# Patient Record
Sex: FEMALE | Race: WHITE | Employment: OTHER | URBAN - METROPOLITAN AREA
[De-identification: names, ages, dates, MRNs, and addresses within clinical notes are randomized per-mention and may not be internally consistent; named-entity substitution may affect disease eponyms.]

---

## 2014-05-07 LAB — ANTIBODY: NONREACTIVE

## 2017-02-07 RX ORDER — ALENDRONATE SODIUM 70 MG/1
TABLET ORAL
Qty: 12 TABLET | Refills: 0 | Status: SHIPPED | OUTPATIENT
Start: 2017-02-07 | End: 2017-03-28 | Stop reason: SDUPTHER

## 2017-03-28 ENCOUNTER — HOSPITAL ENCOUNTER (OUTPATIENT)
Dept: GENERAL RADIOLOGY | Age: 62
Discharge: HOME | End: 2017-03-28
Attending: INTERNAL MEDICINE | Admitting: INTERNAL MEDICINE

## 2017-03-28 ENCOUNTER — OFFICE VISIT (OUTPATIENT)
Age: 62
End: 2017-03-28

## 2017-03-28 VITALS
HEIGHT: 68 IN | BODY MASS INDEX: 27.55 KG/M2 | SYSTOLIC BLOOD PRESSURE: 118 MMHG | DIASTOLIC BLOOD PRESSURE: 74 MMHG | WEIGHT: 181.8 LBS

## 2017-03-28 DIAGNOSIS — Z51.81 MEDICATION MONITORING ENCOUNTER: ICD-10-CM

## 2017-03-28 DIAGNOSIS — M81.0 OSTEOPOROSIS, POSTMENOPAUSAL: Primary | ICD-10-CM

## 2017-03-28 DIAGNOSIS — M81.0 OSTEOPOROSIS, POSTMENOPAUSAL: ICD-10-CM

## 2017-03-28 PROCEDURE — 99214 OFFICE O/P EST MOD 30 MIN: CPT | Performed by: INTERNAL MEDICINE

## 2017-03-28 PROCEDURE — 77080 DXA BONE DENSITY AXIAL: CPT | Performed by: INTERNAL MEDICINE

## 2017-03-28 RX ORDER — ALENDRONATE SODIUM 70 MG/1
TABLET ORAL
Qty: 12 TABLET | Refills: 0 | Status: SHIPPED | OUTPATIENT
Start: 2017-03-28 | End: 2018-01-24 | Stop reason: SDUPTHER

## 2017-04-03 ENCOUNTER — PROCEDURE VISIT (OUTPATIENT)
Age: 62
End: 2017-04-03

## 2017-04-03 DIAGNOSIS — M81.0 OSTEOPOROSIS, POSTMENOPAUSAL: Primary | ICD-10-CM

## 2018-04-09 ENCOUNTER — TELEPHONE (OUTPATIENT)
Age: 63
End: 2018-04-09

## 2018-06-18 ENCOUNTER — OFFICE VISIT (OUTPATIENT)
Age: 63
End: 2018-06-18

## 2018-06-18 ENCOUNTER — HOSPITAL ENCOUNTER (OUTPATIENT)
Dept: GENERAL RADIOLOGY | Age: 63
Discharge: HOME | End: 2018-06-18
Admitting: INTERNAL MEDICINE

## 2018-06-18 ENCOUNTER — PROCEDURE VISIT (OUTPATIENT)
Age: 63
End: 2018-06-18

## 2018-06-18 VITALS
BODY MASS INDEX: 27.92 KG/M2 | DIASTOLIC BLOOD PRESSURE: 78 MMHG | WEIGHT: 184.2 LBS | HEIGHT: 68 IN | SYSTOLIC BLOOD PRESSURE: 122 MMHG

## 2018-06-18 DIAGNOSIS — M81.0 OSTEOPOROSIS, POSTMENOPAUSAL: Primary | ICD-10-CM

## 2018-06-18 DIAGNOSIS — Z51.81 MEDICATION MONITORING ENCOUNTER: ICD-10-CM

## 2018-06-18 DIAGNOSIS — M81.0 OSTEOPOROSIS, POSTMENOPAUSAL: ICD-10-CM

## 2018-06-18 PROCEDURE — 3017F COLORECTAL CA SCREEN DOC REV: CPT | Performed by: INTERNAL MEDICINE

## 2018-06-18 PROCEDURE — 1123F ACP DISCUSS/DSCN MKR DOCD: CPT | Performed by: INTERNAL MEDICINE

## 2018-06-18 PROCEDURE — 1036F TOBACCO NON-USER: CPT | Performed by: INTERNAL MEDICINE

## 2018-06-18 PROCEDURE — G8399 PT W/DXA RESULTS DOCUMENT: HCPCS | Performed by: INTERNAL MEDICINE

## 2018-06-18 PROCEDURE — 77080 DXA BONE DENSITY AXIAL: CPT | Performed by: INTERNAL MEDICINE

## 2018-06-18 PROCEDURE — G8427 DOCREV CUR MEDS BY ELIG CLIN: HCPCS | Performed by: INTERNAL MEDICINE

## 2018-06-18 PROCEDURE — 1090F PRES/ABSN URINE INCON ASSESS: CPT | Performed by: INTERNAL MEDICINE

## 2018-06-18 PROCEDURE — 4040F PNEUMOC VAC/ADMIN/RCVD: CPT | Performed by: INTERNAL MEDICINE

## 2018-06-18 PROCEDURE — 99215 OFFICE O/P EST HI 40 MIN: CPT | Performed by: INTERNAL MEDICINE

## 2018-06-18 PROCEDURE — G8419 CALC BMI OUT NRM PARAM NOF/U: HCPCS | Performed by: INTERNAL MEDICINE

## 2018-06-18 RX ORDER — ALENDRONATE SODIUM 70 MG/1
TABLET ORAL
Qty: 12 TABLET | Refills: 4 | Status: SHIPPED | OUTPATIENT
Start: 2018-06-18 | End: 2019-08-20 | Stop reason: ALTCHOICE

## 2018-06-26 DIAGNOSIS — M81.0 OSTEOPOROSIS, POSTMENOPAUSAL: Primary | ICD-10-CM

## 2018-06-27 ENCOUNTER — NURSE ONLY (OUTPATIENT)
Age: 63
End: 2018-06-27

## 2018-06-27 DIAGNOSIS — M81.0 OSTEOPOROSIS, POSTMENOPAUSAL: Primary | ICD-10-CM

## 2018-06-27 PROCEDURE — 99999 PR OFFICE/OUTPT VISIT,PROCEDURE ONLY: CPT | Performed by: INTERNAL MEDICINE

## 2018-06-27 PROCEDURE — 96372 THER/PROPH/DIAG INJ SC/IM: CPT | Performed by: INTERNAL MEDICINE

## 2018-12-31 DIAGNOSIS — M81.0 OSTEOPOROSIS, POSTMENOPAUSAL: Primary | ICD-10-CM

## 2019-01-03 ENCOUNTER — NURSE ONLY (OUTPATIENT)
Dept: ENDOCRINOLOGY | Age: 64
End: 2019-01-03
Payer: MEDICARE

## 2019-01-03 DIAGNOSIS — M81.0 OSTEOPOROSIS, POSTMENOPAUSAL: Primary | ICD-10-CM

## 2019-01-03 PROCEDURE — 96372 THER/PROPH/DIAG INJ SC/IM: CPT | Performed by: INTERNAL MEDICINE

## 2019-08-20 ENCOUNTER — OFFICE VISIT (OUTPATIENT)
Dept: ENDOCRINOLOGY | Age: 64
End: 2019-08-20
Payer: MEDICARE

## 2019-08-20 ENCOUNTER — PROCEDURE VISIT (OUTPATIENT)
Dept: ENDOCRINOLOGY | Age: 64
End: 2019-08-20
Payer: MEDICARE

## 2019-08-20 ENCOUNTER — HOSPITAL ENCOUNTER (OUTPATIENT)
Dept: GENERAL RADIOLOGY | Age: 64
Discharge: HOME | End: 2019-08-20
Payer: MEDICARE

## 2019-08-20 VITALS
WEIGHT: 181.6 LBS | DIASTOLIC BLOOD PRESSURE: 78 MMHG | BODY MASS INDEX: 27.52 KG/M2 | HEIGHT: 68 IN | SYSTOLIC BLOOD PRESSURE: 115 MMHG

## 2019-08-20 DIAGNOSIS — Z51.81 MEDICATION MONITORING ENCOUNTER: ICD-10-CM

## 2019-08-20 DIAGNOSIS — M81.0 OSTEOPOROSIS, POSTMENOPAUSAL: Primary | ICD-10-CM

## 2019-08-20 DIAGNOSIS — M81.0 OSTEOPOROSIS, POSTMENOPAUSAL: ICD-10-CM

## 2019-08-20 PROCEDURE — 77080 DXA BONE DENSITY AXIAL: CPT

## 2019-08-20 PROCEDURE — G8427 DOCREV CUR MEDS BY ELIG CLIN: HCPCS | Performed by: INTERNAL MEDICINE

## 2019-08-20 PROCEDURE — 99214 OFFICE O/P EST MOD 30 MIN: CPT | Performed by: INTERNAL MEDICINE

## 2019-08-20 PROCEDURE — 1090F PRES/ABSN URINE INCON ASSESS: CPT | Performed by: INTERNAL MEDICINE

## 2019-08-20 PROCEDURE — 1123F ACP DISCUSS/DSCN MKR DOCD: CPT | Performed by: INTERNAL MEDICINE

## 2019-08-20 PROCEDURE — G8419 CALC BMI OUT NRM PARAM NOF/U: HCPCS | Performed by: INTERNAL MEDICINE

## 2019-08-20 PROCEDURE — 96372 THER/PROPH/DIAG INJ SC/IM: CPT | Performed by: INTERNAL MEDICINE

## 2019-08-20 PROCEDURE — 4040F PNEUMOC VAC/ADMIN/RCVD: CPT | Performed by: INTERNAL MEDICINE

## 2019-08-20 PROCEDURE — G8399 PT W/DXA RESULTS DOCUMENT: HCPCS | Performed by: INTERNAL MEDICINE

## 2019-08-20 PROCEDURE — 1036F TOBACCO NON-USER: CPT | Performed by: INTERNAL MEDICINE

## 2019-08-20 PROCEDURE — 77080 DXA BONE DENSITY AXIAL: CPT | Performed by: INTERNAL MEDICINE

## 2019-08-20 PROCEDURE — 3017F COLORECTAL CA SCREEN DOC REV: CPT | Performed by: INTERNAL MEDICINE

## 2019-08-20 RX ORDER — PHENOL 1.4 %
AEROSOL, SPRAY (ML) MUCOUS MEMBRANE
COMMUNITY

## 2019-08-20 RX ORDER — ASPIRIN 81 MG/1
TABLET, CHEWABLE ORAL
COMMUNITY

## 2019-08-20 RX ORDER — VALACYCLOVIR HYDROCHLORIDE 500 MG/1
500 TABLET, FILM COATED ORAL
COMMUNITY
Start: 2018-07-03

## 2019-08-20 RX ORDER — PREDNISONE 20 MG/1
TABLET ORAL
COMMUNITY
Start: 2019-08-16 | End: 2020-09-15 | Stop reason: ALTCHOICE

## 2019-08-20 RX ORDER — LEVOFLOXACIN 750 MG/1
TABLET ORAL
COMMUNITY
Start: 2019-08-16 | End: 2020-09-15 | Stop reason: ALTCHOICE

## 2019-08-20 RX ORDER — CETIRIZINE HYDROCHLORIDE 10 MG/1
10 TABLET ORAL
COMMUNITY

## 2019-08-20 NOTE — PROGRESS NOTES
[facility]) Osteoporosis and [address] [facility] [address] [address] [place] [address]  Phone [phone]  Fax [phone]    PATIENT NAME: Andrey Pineda OF BIRTH: 1955  INITIAL CONSULTATION: 06/18/2008  LAST OFFICE VISIT: 06/18/2018  TODAY'S VISIT: 08/20/2019    Labs @ [facility] 05/2019    PROBLEMS:   Osteoporosis by DXA 11/2007, lowest T-score -3.0, Z-score -2.0 in the spine (L2-L4)    Family history of osteoporosis, mother with vertebral compression fractures    Low bone density by DXA 05/2002, lowest Z-score -1.8 in the spine (L2-L4)    BMD decreased 8313-6070 and further 0264-4649 while on Fosamax    BMD decreased 2865-0307, lowest T-score -2.3 in the spine    Stress fracture of foot 2008, 60-mile breast cancer walk    Coccyx fracture 11/2009 (fell)  Natural menopause age 46 (2004)  Disc disease, thoracic spine surgery 1997 and 2006  NEW 07/2019: sinus infection => bronchitis => pneumonia (prednisone since ~08/07/201    CURRENT MANAGEMENT FOR BONE HEALTH:   Calcium: 300 mg/d diet + 1330 mg/d supplements = 1630 mg/d  Vitamin D: 800 IU/d multivitamin + 800 IU/d with calcium = 1600 IU/d    25-OH D 37 ng/mL 05/2019  Exercise:  3 x weekly, walks 2 x weekly  Pharmacologic therapy: Prolia 60 mg SQ twice yearly started 01/2019    PREVIOUS MEDICATIONS FOR OSTEOPOROSIS:   Fosamax 7402-4631, stopped due to duration of treatment  alendronate 70 mg weekly started back 2014 by Dr. Moo Mario, stopped 01/2018 by Dr. Moo Mario due to GERD symptoms that were not limited to the day of the week she took alendronate    OTHER CURRENT MEDICATIONS (SELECTED): Fluticasone nasal spray  OTC MEDICATIONS: Fish oil, glucosamine    CHIEF COMPLAINT: Here for f/u of low bone density and fractures, monitoring treatment. No new related signs or symptoms.     PAST MEDICAL HISTORY, FAMILY HISTORY, SOCIAL HISTORY AND REVIEW OF SYSTEMS:  Relevant changes since last visit (see patient questionnaire of todays

## 2020-02-26 ENCOUNTER — NURSE ONLY (OUTPATIENT)
Dept: ENDOCRINOLOGY | Age: 65
End: 2020-02-26
Payer: MEDICARE

## 2020-02-26 PROCEDURE — 96372 THER/PROPH/DIAG INJ SC/IM: CPT | Performed by: INTERNAL MEDICINE

## 2020-09-15 ENCOUNTER — HOSPITAL ENCOUNTER (OUTPATIENT)
Dept: GENERAL RADIOLOGY | Age: 65
Discharge: HOME | End: 2020-09-15
Payer: MEDICARE

## 2020-09-15 ENCOUNTER — OFFICE VISIT (OUTPATIENT)
Dept: ENDOCRINOLOGY | Age: 65
End: 2020-09-15
Payer: MEDICARE

## 2020-09-15 VITALS
WEIGHT: 149.4 LBS | DIASTOLIC BLOOD PRESSURE: 79 MMHG | HEIGHT: 68 IN | BODY MASS INDEX: 22.64 KG/M2 | SYSTOLIC BLOOD PRESSURE: 122 MMHG

## 2020-09-15 PROCEDURE — 77080 DXA BONE DENSITY AXIAL: CPT | Performed by: INTERNAL MEDICINE

## 2020-09-15 PROCEDURE — 77080 DXA BONE DENSITY AXIAL: CPT

## 2020-09-15 PROCEDURE — 1123F ACP DISCUSS/DSCN MKR DOCD: CPT | Performed by: INTERNAL MEDICINE

## 2020-09-15 PROCEDURE — 96372 THER/PROPH/DIAG INJ SC/IM: CPT | Performed by: INTERNAL MEDICINE

## 2020-09-15 PROCEDURE — 3017F COLORECTAL CA SCREEN DOC REV: CPT | Performed by: INTERNAL MEDICINE

## 2020-09-15 PROCEDURE — 1090F PRES/ABSN URINE INCON ASSESS: CPT | Performed by: INTERNAL MEDICINE

## 2020-09-15 PROCEDURE — 1036F TOBACCO NON-USER: CPT | Performed by: INTERNAL MEDICINE

## 2020-09-15 PROCEDURE — G8399 PT W/DXA RESULTS DOCUMENT: HCPCS | Performed by: INTERNAL MEDICINE

## 2020-09-15 PROCEDURE — G8420 CALC BMI NORM PARAMETERS: HCPCS | Performed by: INTERNAL MEDICINE

## 2020-09-15 PROCEDURE — 4040F PNEUMOC VAC/ADMIN/RCVD: CPT | Performed by: INTERNAL MEDICINE

## 2020-09-15 PROCEDURE — 99214 OFFICE O/P EST MOD 30 MIN: CPT | Performed by: INTERNAL MEDICINE

## 2020-09-15 PROCEDURE — G8427 DOCREV CUR MEDS BY ELIG CLIN: HCPCS | Performed by: INTERNAL MEDICINE

## 2020-09-15 RX ORDER — BUTALBITAL, ACETAMINOPHEN AND CAFFEINE 50; 325; 40 MG/1; MG/1; MG/1
1 TABLET ORAL EVERY 6 HOURS PRN
COMMUNITY
Start: 2019-12-06

## 2020-09-16 ENCOUNTER — PROCEDURE VISIT (OUTPATIENT)
Dept: ENDOCRINOLOGY | Age: 65
End: 2020-09-16

## 2020-09-16 NOTE — RESULT ENCOUNTER NOTE
[facility]) Osteoporosis and [address] [facility] [address] [place] [address]   Phone [phone]  Fax [phone]    NAME: Yuan Schwarz   : 1955   STUDY DATE: 2020     REFERRING PROVIDER: Isidro Mejia MD    INDICATION(S) FOR PERFORMING THE STUDY:  osteoporosis, age related (M81.0)    CLINICAL INFORMATION PROVIDED BY THE PATIENT: 69-year-old woman. Natural menopause was age 46. No history of fragility fractures. No long-term corticosteroid use. She took Fosamax 2714-6935 and again -2018. Current treatment is Prolia started 2019. Family history of osteoporosis (mother). EQUIPMENT: [employer]. POSITIONING: Good. REGIONS OF INTEREST: Correct. ARTIFACTS: None. STUDY VALID? Yes. Spine BMD is spuriously high because of generalized degenerative change. I deleted L2 starting in  due to T-score discrepancy. L1 had been deleted on previous studies. In  the bottom of the total hip box was too low. T-scores  Initial study: 2007 L3-L4 -3.3 left fem. neck -1.5   Current study: 2020 L3-L4 -2.0 left fem. neck -1.1     The table below shows bone mineral density (grams/cm2), the appropriate measure for comparing serial scans. An increase or decrease is significant based on precision studies done at our center according to the ISCD protocol. PA spine Proximal Femur (left)   Date L3-L4 Fem. neck Trochanter Total hip   2007 0.736 0.696 0.624 0.829   2008 0.850 0.707 0.654 0.869   2009 0.853 0.668 0.637 0.847   2011 0.851 0.729 0.648 0.859   2014 0.780 0.688 0.589 Invalid   2015 0.794 0.715 0.623 0.827   2017 0.831 0.697 0.636 0.847   2018 0.842 0.693 0.637 0.860   2019 0.788 0.716 0.639 0.854   2020 0.879 0.723 0.643 0.859     IMPRESSION:  BONE DENSITY IS LOW, CONSISTENT WITH OSTEOPOROSIS.  SINCE THE LAST DXA, BMD INCREASED IN THE SPINE AND DID NOT CHANGE SIGNIFICANTLY IN THE LEFT HIP. COMPARED WITH 06/2018, BEFORE STARTING PROLIA, BMD IS HIGHER NOW IN THE SPINE AND FEMORAL NECK. Consider repeating this study in 1-2 years to assess the patient's progress. _________________________________________________   Annabella Sharp MD, Director, [facility]) Osteoporosis and [address] [place] [address]

## 2020-09-16 NOTE — PROGRESS NOTES
date).    INTERVAL HISTORY:  See problem list for chronic/inactive conditions. She received Prolia without side effects. No falls, near-falls or fractures. She has been battling upper and lower respiratory issues for about a month. NEUROLOGIC EXAM: Able to rise from chair without using arms. No apparent focal motor or sensory deficit. Reflexes brisk and symmetric. Coordination appears normal.  MUSCULOSKELETAL EXAM: Gait: Intact without difficulty. Steady without assistance. Spine: Spinal contours are normal.  No spine tenderness to palpation or percussion. Ribs and pelvis: Ribs appear normal. Two finger spaces between ribs and pelvis. BONE DENSITY: Most recent done here using [employer] ([employer]) equipment. I deleted L2 starting in 2015 due to T-score discrepancy. L1 had been deleted on previous studies. T-scores  Initial study: 11/26/2007 L3-L4 -3.3 left fem. neck -1.5   Current study: 08/31/2020 L3-L4 -2.0 left fem. neck -1.1     The table below shows bone mineral density (grams/cm2), the appropriate measure for comparing serial scans. An increase or decrease is significant based on precision studies done at our center according to the ISCD protocol. PA spine Proximal Femur (left)   Date L3-L4 Fem. neck Trochanter Total hip   11/26/2007 0.736 0.696 0.624 0.829   11/26/2008 0.850 0.707 0.654 0.869   12/07/2009 0.853 0.668 0.637 0.847   02/09/2011 0.851 0.729 0.648 0.859   05/20/2014 0.780 0.688 0.589 Invalid   12/14/2015 0.794 0.715 0.623 0.827   03/28/2017 0.831 0.697 0.636 0.847   06/18/2018 0.842 0.693 0.637 0.860   08/20/2019 0.788 0.716 0.639 0.854   08/31/2020 0.879 0.723 0.643 0.859     IMPRESSION:  BONE DENSITY IS LOW, CONSISTENT WITH OSTEOPOROSIS. SINCE THE LAST DXA, BMD INCREASED IN THE SPINE AND DID NOT CHANGE SIGNIFICANTLY IN THE LEFT HIP. COMPARED WITH 06/2018, BEFORE STARTING PROLIA, BMD IS HIGHER NOW IN THE SPINE AND FEMORAL NECK.      LABS. 07/2015, Ca 10.2, Cr 0.9. 05/2017 Ca 9.8 Cr 0. 9. 01/2018 TSH. 95.2918 Ca 8.6 Cr 0.8. IMAGING. DXA printouts reviewed. 02/2018 CXR. ASSESSMENT: Osteoporosis with bone density lower than expected and lower than expected for her age and sex due in part to family history. Spine BMD decreased from 2006 to 2007 while on bisphosphonate therapy. Spine BMD decreased from 2006 to 2007 and we recommended Forteo which she declined. Spine BMD has been stable since 2007 but femoral neck BMD decreased 2008 to 2009. She is doing find back on treatment with alendronate since 2014 (but off since 01/2018 due to GI side effects). She is doing OK with Prolia started 01/2019. PLANS:   OK to give Prolia today and continue Prolia 60 mg SQ twice yearly. Return appointment in 1 year with DXA      I spent 25 minutes face to face with this patient. Over 50% of that time was spent on counseling and care coordination. See assessment and plan for counseling and care coordination details. Tiburcio Sharp MD, Director, [facility]) Osteoporosis and Bone Health Services    CC: Alayna Weiss MD

## 2021-03-17 ENCOUNTER — NURSE ONLY (OUTPATIENT)
Dept: ENDOCRINOLOGY | Age: 66
End: 2021-03-17
Payer: MEDICARE

## 2021-03-17 DIAGNOSIS — M81.0 OSTEOPOROSIS, POSTMENOPAUSAL: ICD-10-CM

## 2021-03-17 PROCEDURE — 96372 THER/PROPH/DIAG INJ SC/IM: CPT | Performed by: INTERNAL MEDICINE

## 2021-03-18 ENCOUNTER — TELEPHONE (OUTPATIENT)
Dept: ENDOCRINOLOGY | Age: 66
End: 2021-03-18

## 2021-03-18 NOTE — TELEPHONE ENCOUNTER
Foot fractures are not part of osteoporosis and not what Prolia works on. Podiatrists and orthopedists are the specialists who deal with foot fractures. These are due to the shape of a person's foot, their footgear, how their foot lands when they walk, and how much walking they are doing.

## 2021-03-22 NOTE — TELEPHONE ENCOUNTER
Left message for the patient to call the office related to foot fractures    Foot fractures are not part of osteoporosis and not what Prolia works on.     Podiatrists and orthopedists are the specialists who deal with foot fractures. These are due to the shape of a person's foot, their footgear, how their foot lands when they walk, and how much walking they are doing.

## 2021-04-14 ENCOUNTER — OFFICE VISIT (OUTPATIENT)
Dept: INTERNAL MEDICINE CLINIC | Age: 66
End: 2021-04-14
Payer: MEDICARE

## 2021-04-14 VITALS
SYSTOLIC BLOOD PRESSURE: 118 MMHG | BODY MASS INDEX: 26.68 KG/M2 | HEIGHT: 67 IN | WEIGHT: 170 LBS | RESPIRATION RATE: 16 BRPM | HEART RATE: 64 BPM | OXYGEN SATURATION: 98 % | TEMPERATURE: 98.4 F | DIASTOLIC BLOOD PRESSURE: 80 MMHG

## 2021-04-14 DIAGNOSIS — Z76.89 ENCOUNTER TO ESTABLISH CARE: Primary | ICD-10-CM

## 2021-04-14 DIAGNOSIS — M54.50 CHRONIC MIDLINE LOW BACK PAIN WITHOUT SCIATICA: ICD-10-CM

## 2021-04-14 DIAGNOSIS — G89.29 CHRONIC MIDLINE LOW BACK PAIN WITHOUT SCIATICA: ICD-10-CM

## 2021-04-14 DIAGNOSIS — J30.9 ALLERGIC RHINITIS, UNSPECIFIED SEASONALITY, UNSPECIFIED TRIGGER: ICD-10-CM

## 2021-04-14 DIAGNOSIS — L72.3 SEBACEOUS CYST OF EAR: ICD-10-CM

## 2021-04-14 DIAGNOSIS — Z23 NEED FOR VACCINATION FOR PNEUMOCOCCUS: ICD-10-CM

## 2021-04-14 DIAGNOSIS — M81.0 OSTEOPOROSIS, UNSPECIFIED OSTEOPOROSIS TYPE, UNSPECIFIED PATHOLOGICAL FRACTURE PRESENCE: ICD-10-CM

## 2021-04-14 DIAGNOSIS — E55.9 VITAMIN D DEFICIENCY: ICD-10-CM

## 2021-04-14 DIAGNOSIS — Z12.11 SCREENING FOR COLON CANCER: ICD-10-CM

## 2021-04-14 DIAGNOSIS — R22.32 MASS OF LEFT WRIST: ICD-10-CM

## 2021-04-14 PROCEDURE — 3017F COLORECTAL CA SCREEN DOC REV: CPT | Performed by: INTERNAL MEDICINE

## 2021-04-14 PROCEDURE — 4040F PNEUMOC VAC/ADMIN/RCVD: CPT | Performed by: INTERNAL MEDICINE

## 2021-04-14 PROCEDURE — 1036F TOBACCO NON-USER: CPT | Performed by: INTERNAL MEDICINE

## 2021-04-14 PROCEDURE — G8417 CALC BMI ABV UP PARAM F/U: HCPCS | Performed by: INTERNAL MEDICINE

## 2021-04-14 PROCEDURE — G8427 DOCREV CUR MEDS BY ELIG CLIN: HCPCS | Performed by: INTERNAL MEDICINE

## 2021-04-14 PROCEDURE — 1090F PRES/ABSN URINE INCON ASSESS: CPT | Performed by: INTERNAL MEDICINE

## 2021-04-14 PROCEDURE — G0009 ADMIN PNEUMOCOCCAL VACCINE: HCPCS | Performed by: INTERNAL MEDICINE

## 2021-04-14 PROCEDURE — 1123F ACP DISCUSS/DSCN MKR DOCD: CPT | Performed by: INTERNAL MEDICINE

## 2021-04-14 PROCEDURE — G8399 PT W/DXA RESULTS DOCUMENT: HCPCS | Performed by: INTERNAL MEDICINE

## 2021-04-14 PROCEDURE — 99204 OFFICE O/P NEW MOD 45 MIN: CPT | Performed by: INTERNAL MEDICINE

## 2021-04-14 PROCEDURE — 90732 PPSV23 VACC 2 YRS+ SUBQ/IM: CPT | Performed by: INTERNAL MEDICINE

## 2021-04-14 SDOH — HEALTH STABILITY: MENTAL HEALTH: HOW OFTEN DO YOU HAVE A DRINK CONTAINING ALCOHOL?: MONTHLY OR LESS

## 2021-04-14 SDOH — ECONOMIC STABILITY: INCOME INSECURITY: HOW HARD IS IT FOR YOU TO PAY FOR THE VERY BASICS LIKE FOOD, HOUSING, MEDICAL CARE, AND HEATING?: NOT ASKED

## 2021-04-14 SDOH — ECONOMIC STABILITY: FOOD INSECURITY: WITHIN THE PAST 12 MONTHS, THE FOOD YOU BOUGHT JUST DIDN'T LAST AND YOU DIDN'T HAVE MONEY TO GET MORE.: NOT ASKED

## 2021-04-14 SDOH — ECONOMIC STABILITY: TRANSPORTATION INSECURITY
IN THE PAST 12 MONTHS, HAS THE LACK OF TRANSPORTATION KEPT YOU FROM MEDICAL APPOINTMENTS OR FROM GETTING MEDICATIONS?: NOT ASKED

## 2021-04-14 ASSESSMENT — PATIENT HEALTH QUESTIONNAIRE - PHQ9
1. LITTLE INTEREST OR PLEASURE IN DOING THINGS: 0
2. FEELING DOWN, DEPRESSED OR HOPELESS: 0
SUM OF ALL RESPONSES TO PHQ9 QUESTIONS 1 & 2: 0
SUM OF ALL RESPONSES TO PHQ QUESTIONS 1-9: 0
SUM OF ALL RESPONSES TO PHQ QUESTIONS 1-9: 0

## 2021-04-14 ASSESSMENT — ENCOUNTER SYMPTOMS
DIARRHEA: 0
BACK PAIN: 1
RHINORRHEA: 0
COUGH: 0
SORE THROAT: 0
NAUSEA: 0
ABDOMINAL PAIN: 0
TROUBLE SWALLOWING: 0
SHORTNESS OF BREATH: 0

## 2021-04-14 NOTE — PATIENT INSTRUCTIONS
shingrix -pharmacy-2 weeks after pneumovax    Jalen Rosen, [address]    Gi/colon  [phone]

## 2021-04-20 DIAGNOSIS — R73.9 HYPERGLYCEMIA: Primary | ICD-10-CM

## 2021-04-20 DIAGNOSIS — Z76.89 ENCOUNTER TO ESTABLISH CARE: ICD-10-CM

## 2021-04-20 DIAGNOSIS — M81.0 OSTEOPOROSIS, UNSPECIFIED OSTEOPOROSIS TYPE, UNSPECIFIED PATHOLOGICAL FRACTURE PRESENCE: ICD-10-CM

## 2021-04-20 LAB
A/G RATIO: 2 (ref 1.1–2.2)
ALBUMIN SERPL-MCNC: 4.5 G/DL (ref 3.4–5)
ALP BLD-CCNC: 51 U/L (ref 40–129)
ALT SERPL-CCNC: 12 U/L (ref 10–40)
ANION GAP SERPL CALCULATED.3IONS-SCNC: 10 MMOL/L (ref 3–16)
AST SERPL-CCNC: 14 U/L (ref 15–37)
BILIRUB SERPL-MCNC: 0.4 MG/DL (ref 0–1)
BUN BLDV-MCNC: 15 MG/DL (ref 7–20)
CALCIUM SERPL-MCNC: 9 MG/DL (ref 8.3–10.6)
CHLORIDE BLD-SCNC: 105 MMOL/L (ref 99–110)
CHOLESTEROL, TOTAL: 182 MG/DL (ref 0–199)
CO2: 28 MMOL/L (ref 21–32)
CREAT SERPL-MCNC: 0.8 MG/DL (ref 0.6–1.2)
GFR AFRICAN AMERICAN: >60
GFR NON-AFRICAN AMERICAN: >60
GLOBULIN: 2.2 G/DL
GLUCOSE FASTING: 105 MG/DL (ref 70–99)
HDLC SERPL-MCNC: 68 MG/DL (ref 40–60)
LDL CHOLESTEROL CALCULATED: 95 MG/DL
POTASSIUM SERPL-SCNC: 4.8 MMOL/L (ref 3.5–5.1)
SODIUM BLD-SCNC: 143 MMOL/L (ref 136–145)
TOTAL PROTEIN: 6.7 G/DL (ref 6.4–8.2)
TRIGL SERPL-MCNC: 96 MG/DL (ref 0–150)
VITAMIN D 25-HYDROXY: 57.9 NG/ML
VLDLC SERPL CALC-MCNC: 19 MG/DL

## 2021-06-09 ENCOUNTER — TELEPHONE (OUTPATIENT)
Dept: INTERNAL MEDICINE CLINIC | Age: 66
End: 2021-06-09

## 2021-06-09 NOTE — TELEPHONE ENCOUNTER
----- Message from [facility] sent at 6/9/2021  2:26 PM EDT -----  Subject: Appointment Request    Reason for Call: Routine Pre-Op    QUESTIONS  Type of Appointment? Established Patient  Reason for appointment request? No appointments available during search  Additional Information for Provider? Patient has surgery on June 22nd 2021, she said she needs a Pre op appt and she screened green. It will be   at [facility] for cataract surgery. Call pt back to   schedule.   ---------------------------------------------------------------------------  --------------  CALL BACK INFO  What is the best way for the office to contact you? OK to leave message on   voicemail  Preferred Call Back Phone Number? [phone]  ---------------------------------------------------------------------------  --------------  SCRIPT ANSWERS  Relationship to Patient? Self  Appointment reason? Symptomatic  Select script based on patient symptoms? Adult Pre-Op   Do you have question for your provider that need to be answered prior to   scheduling your pre-op appointment? No  Have you been diagnosed with, awaiting test results for, or told that you   are suspected of having COVID-19 (Coronavirus)? (If patient has tested   negative or was tested as a requirement for work, school, or travel and   not based on symptoms, answer no)? No  Do you currently have flu-like symptoms including fever or chills, cough,   shortness of breath, difficulty breathing, or new loss of taste or smell? No  Have you had close contact with someone with COVID-19 in the last 14 days? No  (Service Expert  click yes below to proceed with Diarize As Usual   Scheduling)?  Yes

## 2021-06-16 ENCOUNTER — OFFICE VISIT (OUTPATIENT)
Dept: INTERNAL MEDICINE CLINIC | Age: 66
End: 2021-06-16
Payer: MEDICARE

## 2021-06-16 VITALS
BODY MASS INDEX: 24.86 KG/M2 | TEMPERATURE: 98.5 F | SYSTOLIC BLOOD PRESSURE: 100 MMHG | HEIGHT: 68 IN | WEIGHT: 164 LBS | OXYGEN SATURATION: 98 % | RESPIRATION RATE: 16 BRPM | DIASTOLIC BLOOD PRESSURE: 64 MMHG | HEART RATE: 68 BPM

## 2021-06-16 DIAGNOSIS — Z01.818 PREOP EXAMINATION: Primary | ICD-10-CM

## 2021-06-16 PROCEDURE — 99212 OFFICE O/P EST SF 10 MIN: CPT | Performed by: INTERNAL MEDICINE

## 2021-06-16 PROCEDURE — G8428 CUR MEDS NOT DOCUMENT: HCPCS | Performed by: INTERNAL MEDICINE

## 2021-06-16 PROCEDURE — 1090F PRES/ABSN URINE INCON ASSESS: CPT | Performed by: INTERNAL MEDICINE

## 2021-06-16 PROCEDURE — G8417 CALC BMI ABV UP PARAM F/U: HCPCS | Performed by: INTERNAL MEDICINE

## 2021-06-16 RX ORDER — FLUTICASONE PROPIONATE 50 MCG
2 SPRAY, SUSPENSION (ML) NASAL DAILY
Qty: 1 BOTTLE | Refills: 11
Start: 2021-06-16

## 2021-06-16 ASSESSMENT — ENCOUNTER SYMPTOMS
SORE THROAT: 1
ABDOMINAL PAIN: 0
RHINORRHEA: 0
DIARRHEA: 0
SHORTNESS OF BREATH: 0
TROUBLE SWALLOWING: 0
NAUSEA: 0
COUGH: 0

## 2021-06-16 NOTE — PROGRESS NOTES
normal.      Nose: Nose normal.   Eyes:      General: No scleral icterus. Extraocular Movements: Extraocular movements intact. Neck:      Thyroid: No thyromegaly. Cardiovascular:      Rate and Rhythm: Normal rate and regular rhythm. Heart sounds: No murmur heard. Pulmonary:      Effort: No respiratory distress. Breath sounds: Normal breath sounds. No wheezing or rales. Abdominal:      General: Bowel sounds are normal. There is no distension. Palpations: Abdomen is soft. Tenderness: There is no abdominal tenderness. Musculoskeletal:         General: Normal range of motion. Lymphadenopathy:      Cervical: No cervical adenopathy. Skin:     General: Skin is warm and dry. Neurological:      Mental Status: She is alert and oriented to person, place, and time. Cranial Nerves: No cranial nerve deficit. Sensory: No sensory deficit. Coordination: Coordination normal.   Psychiatric:         Behavior: Behavior normal.                Assessment/Plan:     1. Preop examination    Pre-Operative Risk assessment using 2014 ACC/AHA guidelines     Emergent procedure No  Active Cardiac Condition No (decompensated HF, Arrhythmia, MI <3 weeks, severe valve disease)  Risk Level of Procedure Low Risk (endoscopy, superficial skin, breast, ambulatory, or cataract, etc.)  Revised Cardiac Risk Index Risk factors: None  Measurement of Exercise Tolerance before Surgery >4 Yes    According to the 2014 ACC/AHA pre-operative risk assessment guidelines Shailesh Toure is a low risk for major cardiac complications during a low risk procedure and may continue as planned. Specific medication recommendations: May skip her routine medications morning of surgery.

## 2021-07-21 ENCOUNTER — OFFICE VISIT (OUTPATIENT)
Dept: INTERNAL MEDICINE CLINIC | Age: 66
End: 2021-07-21
Payer: MEDICARE

## 2021-07-21 ENCOUNTER — TELEPHONE (OUTPATIENT)
Dept: INTERNAL MEDICINE CLINIC | Age: 66
End: 2021-07-21

## 2021-07-21 VITALS
SYSTOLIC BLOOD PRESSURE: 112 MMHG | HEART RATE: 68 BPM | DIASTOLIC BLOOD PRESSURE: 68 MMHG | OXYGEN SATURATION: 98 % | HEIGHT: 68 IN | WEIGHT: 164 LBS | BODY MASS INDEX: 24.86 KG/M2 | TEMPERATURE: 98.1 F | RESPIRATION RATE: 16 BRPM

## 2021-07-21 DIAGNOSIS — F33.0 MAJOR DEPRESSIVE DISORDER, RECURRENT, MILD (HCC): ICD-10-CM

## 2021-07-21 DIAGNOSIS — Z01.818 PREOP EXAMINATION: ICD-10-CM

## 2021-07-21 DIAGNOSIS — Z00.00 ROUTINE GENERAL MEDICAL EXAMINATION AT A HEALTH CARE FACILITY: Primary | ICD-10-CM

## 2021-07-21 PROBLEM — F33.1 MAJOR DEPRESSIVE DISORDER, RECURRENT, MODERATE (HCC): Status: ACTIVE | Noted: 2021-07-21

## 2021-07-21 PROBLEM — F33.9 MAJOR DEPRESSIVE DISORDER, RECURRENT, UNSPECIFIED (HCC): Status: ACTIVE | Noted: 2021-07-21

## 2021-07-21 PROCEDURE — 4040F PNEUMOC VAC/ADMIN/RCVD: CPT | Performed by: INTERNAL MEDICINE

## 2021-07-21 PROCEDURE — G0439 PPPS, SUBSEQ VISIT: HCPCS | Performed by: INTERNAL MEDICINE

## 2021-07-21 PROCEDURE — 1123F ACP DISCUSS/DSCN MKR DOCD: CPT | Performed by: INTERNAL MEDICINE

## 2021-07-21 PROCEDURE — 3017F COLORECTAL CA SCREEN DOC REV: CPT | Performed by: INTERNAL MEDICINE

## 2021-07-21 RX ORDER — DULOXETIN HYDROCHLORIDE 30 MG/1
30 CAPSULE, DELAYED RELEASE ORAL DAILY
Qty: 30 CAPSULE | Refills: 1 | Status: SHIPPED | OUTPATIENT
Start: 2021-07-21

## 2021-07-21 RX ORDER — DULOXETIN HYDROCHLORIDE 30 MG/1
30 CAPSULE, DELAYED RELEASE ORAL DAILY
Qty: 30 CAPSULE | Refills: 1 | Status: SHIPPED | OUTPATIENT
Start: 2021-07-21 | End: 2021-07-21 | Stop reason: SDUPTHER

## 2021-07-21 ASSESSMENT — LIFESTYLE VARIABLES
HOW MANY STANDARD DRINKS CONTAINING ALCOHOL DO YOU HAVE ON A TYPICAL DAY: 0
HOW OFTEN DURING THE LAST YEAR HAVE YOU FAILED TO DO WHAT WAS NORMALLY EXPECTED FROM YOU BECAUSE OF DRINKING: 0
AUDIT-C TOTAL SCORE: 2
AUDIT TOTAL SCORE: 2
HAVE YOU OR SOMEONE ELSE BEEN INJURED AS A RESULT OF YOUR DRINKING: 0
HOW OFTEN DO YOU HAVE A DRINK CONTAINING ALCOHOL: 2
HOW OFTEN DURING THE LAST YEAR HAVE YOU BEEN UNABLE TO REMEMBER WHAT HAPPENED THE NIGHT BEFORE BECAUSE YOU HAD BEEN DRINKING: 0
HOW OFTEN DO YOU HAVE SIX OR MORE DRINKS ON ONE OCCASION: 0
HAS A RELATIVE, FRIEND, DOCTOR, OR ANOTHER HEALTH PROFESSIONAL EXPRESSED CONCERN ABOUT YOUR DRINKING OR SUGGESTED YOU CUT DOWN: 0
HOW OFTEN DURING THE LAST YEAR HAVE YOU HAD A FEELING OF GUILT OR REMORSE AFTER DRINKING: 0
HOW OFTEN DURING THE LAST YEAR HAVE YOU FOUND THAT YOU WERE NOT ABLE TO STOP DRINKING ONCE YOU HAD STARTED: 0
HOW OFTEN DURING THE LAST YEAR HAVE YOU NEEDED AN ALCOHOLIC DRINK FIRST THING IN THE MORNING TO GET YOURSELF GOING AFTER A NIGHT OF HEAVY DRINKING: 0

## 2021-07-21 ASSESSMENT — PATIENT HEALTH QUESTIONNAIRE - PHQ9
SUM OF ALL RESPONSES TO PHQ QUESTIONS 1-9: 1
1. LITTLE INTEREST OR PLEASURE IN DOING THINGS: 0
2. FEELING DOWN, DEPRESSED OR HOPELESS: 1
SUM OF ALL RESPONSES TO PHQ9 QUESTIONS 1 & 2: 1

## 2021-07-21 ASSESSMENT — ENCOUNTER SYMPTOMS
TROUBLE SWALLOWING: 0
SHORTNESS OF BREATH: 0
PHOTOPHOBIA: 0
EYE REDNESS: 0
RHINORRHEA: 0
EYE ITCHING: 0
SORE THROAT: 0
DIARRHEA: 0
ABDOMINAL PAIN: 0
EYE PAIN: 0
COUGH: 0
NAUSEA: 0
BACK PAIN: 1
EYE DISCHARGE: 0

## 2021-07-21 NOTE — TELEPHONE ENCOUNTER
Duloxetine was sent to [facility] in error. Please send to [facility] on [address] instead.

## 2021-07-21 NOTE — PATIENT INSTRUCTIONS
Personalized Preventive Plan for Francia Shea - 7/21/2021  Medicare offers a range of preventive health benefits. Some of the tests and screenings are paid in full while other may be subject to a deductible, co-insurance, and/or copay. Some of these benefits include a comprehensive review of your medical history including lifestyle, illnesses that may run in your family, and various assessments and screenings as appropriate. After reviewing your medical record and screening and assessments performed today your provider may have ordered immunizations, labs, imaging, and/or referrals for you. A list of these orders (if applicable) as well as your Preventive Care list are included within your After Visit Summary for your review. Other Preventive Recommendations:    · A preventive eye exam performed by an eye specialist is recommended every 1-2 years to screen for glaucoma; cataracts, macular degeneration, and other eye disorders. · A preventive dental visit is recommended every 6 months. · Try to get at least 150 minutes of exercise per week or 10,000 steps per day on a pedometer . · Order or download the FREE \"Exercise & Physical Activity: Your Everyday Guide\" from [employer]. Call 7-763.696.1602 or search [employer] online. · You need 9884-0553 mg of calcium and 1295-4115 IU of vitamin D per day. It is possible to meet your calcium requirement with diet alone, but a vitamin D supplement is usually necessary to meet this goal.  · When exposed to the sun, use a sunscreen that protects against both UVA and UVB radiation with an SPF of 30 or greater. Reapply every 2 to 3 hours or after sweating, drying off with a towel, or swimming. · Always wear a seat belt when traveling in a car. Always wear a helmet when riding a bicycle or motorcycle.

## 2021-07-21 NOTE — PROGRESS NOTES
Medicare Annual Wellness Visit  Name: Ysabel Orr Date: 2021   MRN: [id number] Sex: Female   Age: 74 y.o. Ethnicity: Non- / Non    : 1955 Race: White (non-)      Usman Castaneda is here for Medicare AWV    Screenings for behavioral, psychosocial and functional/safety risks, and cognitive dysfunction are all negative except as indicated below. These results, as well as other patient data from the [address] form, are documented in Flowsheets linked to this Encounter. No Known Allergies      Prior to Visit Medications    Medication Sig Taking? Authorizing Provider   DULoxetine (CYMBALTA) 30 MG extended release capsule Take 1 capsule by mouth daily Yes Elyssa Warren MD   butalbital-acetaminophen-caffeine (FIORICET, ESGIC) -40 MG per tablet Take 1 tablet by mouth every 6 hours as needed Yes Historical Provider, MD   TURMERIC PO Take by mouth Yes Historical Provider, MD   CRANBERRY PO Take by mouth Yes Historical Provider, MD   valACYclovir (VALTREX) 500 MG tablet Take 500 mg by mouth Yes Historical Provider, MD   Multiple Vitamins-Minerals (MULTIVITAMIN WOMEN) TABS Take by mouth Yes Historical Provider, MD   cetirizine (ZYRTEC) 10 MG tablet Take 10 mg by mouth Yes Historical Provider, MD   aspirin 81 MG chewable tablet  Yes Historical Provider, MD   PROLIA 60 MG/ML SOLN SC injection Inject 1 mL into the skin See Admin Instructions One dose every 6 months Yes Tono Medley MD   calcium carbonate (OSCAL) 500 MG TABS tablet Take 500 mg by mouth daily.  Yes Historical Provider, MD   vitamin D (CHOLECALCIFEROL) 1000 UNIT TABS tablet Take 2,000 Units by mouth daily  Yes Historical Provider, MD   fluticasone (FLONASE) 50 MCG/ACT nasal spray 2 sprays by Nasal route daily  Elyssa Warren MD         Past Medical History:   Diagnosis Date    Allergic rhinitis     gets shots-dr jesus lagos    Foot fracture     several    Headache allergy related    Low back pain     cyst, ashwini baum, gary navas    Osteoporosis     on prolia    Pneumonia 2019    Pre-diabetes        Past Surgical History:   Procedure Laterality Date    CATARACT REMOVAL  06/2021    COLONOSCOPY  04/2021    fu 3 yrs    CYSTOSCOPY      LUMBAR DISCECTOMY      times 2    SEPTOPLASTY      WISDOM TOOTH EXTRACTION           Family History   Problem Relation Age of Onset    Dementia Mother     Heart Attack Father        CareTeam (Including outside providers/suppliers regularly involved in providing care):   Patient Care Team:  Denia Toro MD as PCP - General (Internal Medicine)  Denia Toro MD as PCP - [facility] Empaneled Provider  Marilee Kitchen (Ophthalmology)    Wt Readings from Last 3 Encounters:   07/21/21 164 lb (74.4 kg)   06/16/21 164 lb (74.4 kg)   04/14/21 170 lb (77.1 kg)     Vitals:    07/21/21 1444   BP: 112/68   Site: Right Upper Arm   Position: Sitting   Cuff Size: Medium Adult   Pulse: 68   Resp: 16   Temp: 98.1 °F (36.7 °C)   TempSrc: Oral   SpO2: 98%   Weight: 164 lb (74.4 kg)   Height: 5' 7.5\" (1.715 m)     Body mass index is 25.31 kg/m². Based upon direct observation of the patient, evaluation of cognition reveals recent and remote memory intact. Clock was quickly corrected      Patient's complete Health Risk Assessment and screening values have been reviewed and are found in Flowsheets. The following problems were reviewed today and where indicated follow up appointments were made and/or referrals ordered. Positive Risk Factor Screenings with Interventions:            General Health and ACP:  General  In general, how would you say your health is?: Very Good  In the past 7 days, have you experienced any of the following?  New or Increased Pain, New or Increased Fatigue, Loneliness, Social Isolation, Stress or Anger?: None of These  Do you get the social and emotional support that you need?: (!) No  Do you have a Living Will?: Yes  Advance Directives     Power of  Living Will ACP-Advance Directive ACP-Power of     Not on File Not on File Not on File Not on File      General Health Risk Interventions:  · Inadequate social/emotional support: has therapist she sees monthly, starting duloxetine for feelings of sadness and low back pain     Hearing/Vision:  No exam data present  Hearing/Vision  Do you or your family notice any trouble with your hearing that hasn't been managed with hearing aids?: No  Do you have difficulty driving, watching TV, or doing any of your daily activities because of your eyesight?: (!) Yes  Have you had an eye exam within the past year?: Yes  Hearing/Vision Interventions:  · Vision concerns:  undergoing surgery for cataracts    Safety:  Safety  Do you have working smoke detectors?: Yes  Have all throw rugs been removed or fastened?: Yes  Do you have non-slip mats or surfaces in all bathtubs/showers?: (!) No  Do all of your stairways have a railing or banister?: Yes  Are your doorways, halls and stairs free of clutter?: Yes  Do you always fasten your seatbelt when you are in a car?: Yes  Safety Interventions:  · Home safety tips provided     Personalized Preventive Plan   Current Health Maintenance Status  Immunization History   Administered Date(s) Administered    Hepatitis A Ped/Adol (Havrix, Vaqta) 08/24/2018    Hepatitis A/Hepatitis B (Twinrix) 07/03/2018, 08/24/2018    Hepatitis B 08/24/2018    Pneumococcal Conjugate 13-valent ([id number]) 08/24/2018    Pneumococcal Polysaccharide ([id number]) 04/14/2021    Tdap (Boostrix, Adacel) 10/01/2008, 07/03/2018    Yellow Fever (YF-Vax) 06/25/2018    Zoster Live (Zostavax) 05/27/2014, 11/17/2015        Health Maintenance   Topic Date Due    Shingles Vaccine (2 of 3) 01/12/2016    Annual Wellness Visit (AWV)  Never done    Flu vaccine (1) 09/01/2021    A1C test (Diabetic or Prediabetic)  05/05/2022    Breast cancer screen 02/04/2023    Lipid screen  04/20/2026    DTaP/Tdap/Td vaccine (3 - Td or Tdap) 07/03/2028    Colon cancer screen colonoscopy  04/27/2031    DEXA (modify frequency per FRAX score)  Completed    Pneumococcal 65+ years Vaccine  Completed    COVID-19 Vaccine  Completed    Hepatitis C screen  Completed    Hepatitis A vaccine  Aged Out    Hepatitis B vaccine  Aged Out    Hib vaccine  Aged Out    Meningococcal (ACWY) vaccine  Aged Out     Recommendations for Buyou Due: see orders and patient instructions/AVS.  . Recommended screening schedule for the next 5-10 years is provided to the patient in written form: see Patient Instructions/AVS.    Tyler Jackson was seen today for medicare awv. Diagnoses and all orders for this visit:    Routine general medical examination at a health care facility    Preop examination    Major depressive disorder, recurrent, mild  -     DULoxetine (CYMBALTA) 30 MG extended release capsule; Take 1 capsule by mouth daily         ---      Preoperative Consultation      Arleen Coombs  YOB: 1955    Date of Service:  7/21/2021    Vitals:    07/21/21 1444   BP: 112/68   Site: Right Upper Arm   Position: Sitting   Cuff Size: Medium Adult   Pulse: 68  Comment: Regular   Resp: 16   Temp: 98.1 °F (36.7 °C)   TempSrc: Oral   SpO2: 98%  Comment: Room Air   Weight: 164 lb (74.4 kg)   Height: 5' 7.5\" (1.715 m)           Chief Complaint   Patient presents with    Medicare AWV       HPI:    This patient presents tot office today for a preoperative consultation at the request of surgeon, Dr. Lowell Richey, who plans on performing left eye cataract surgery on August 2 at [facility].       Planned anesthesia: Topical anesthesia /MAC  Known anesthesia problems: None   Bleeding risk: No recent or remote history of abnormal bleeding  X nosebleeds which resolved with cauterization  Personal or FH of DVT/PE: No x mom remote history of pulmonary embolism after surgery  Recent steroid use: No  Exercise tolerance: greater than 4 METs      Past Medical History:   Diagnosis Date    Allergic rhinitis     gets shots-dr jesus lagos    Foot fracture     several    Headache     allergy related    Low back pain     cyst, ashwini sarika, gary navas    Osteoporosis     on prolia    Pneumonia 2019    Pre-diabetes      Past Surgical History:   Procedure Laterality Date    CATARACT REMOVAL  06/2021    COLONOSCOPY  04/2021    fu 3 yrs    CYSTOSCOPY      LUMBAR DISCECTOMY      times 2    SEPTOPLASTY      WISDOM TOOTH EXTRACTION       Family History   Problem Relation Age of Onset    Dementia Mother     Heart Attack Father      Social History     Tobacco Use    Smoking status: Never Smoker    Smokeless tobacco: Never Used   Substance Use Topics    Alcohol use: Yes     Comment: rarely    Drug use: No       Outpatient Medications Marked as Taking for the 7/21/21 encounter (Office Visit) with Rajni Palma MD   Medication Sig Dispense Refill    DULoxetine (CYMBALTA) 30 MG extended release capsule Take 1 capsule by mouth daily 30 capsule 1    butalbital-acetaminophen-caffeine (FIORICET, ESGIC) -40 MG per tablet Take 1 tablet by mouth every 6 hours as needed      TURMERIC PO Take by mouth      CRANBERRY PO Take by mouth      valACYclovir (VALTREX) 500 MG tablet Take 500 mg by mouth      Multiple Vitamins-Minerals (MULTIVITAMIN WOMEN) TABS Take by mouth      cetirizine (ZYRTEC) 10 MG tablet Take 10 mg by mouth      aspirin 81 MG chewable tablet M-W-F      PROLIA 60 MG/ML SOLN SC injection Inject 1 mL into the skin See Admin Instructions One dose every 6 months 1 Syringe 1    calcium carbonate (OSCAL) 500 MG TABS tablet Take 500 mg by mouth daily.  vitamin D (CHOLECALCIFEROL) 1000 UNIT TABS tablet Take 2,000 Units by mouth daily        No Known Allergies    Review of Systems   Constitutional: Negative for fatigue and fever.    HENT: Negative for rhinorrhea, sore throat and trouble swallowing. Eyes: Positive for visual disturbance (cant see close up). Negative for photophobia, pain, discharge, redness and itching. Respiratory: Negative for cough and shortness of breath. Cardiovascular: Negative for chest pain and palpitations. Gastrointestinal: Negative for abdominal pain, diarrhea and nausea. Genitourinary: Negative for decreased urine volume, dysuria and frequency. Musculoskeletal: Positive for back pain. Negative for arthralgias and myalgias. Skin: Negative for rash. Allergic/Immunologic: Negative for immunocompromised state. Neurological: Negative for dizziness, numbness and headaches. Hematological: Does not bruise/bleed easily. Psychiatric/Behavioral: Positive for dysphoric mood. Negative for sleep disturbance. The patient is not nervous/anxious. Physical Exam  Vitals and nursing note reviewed. Constitutional:       General: She is not in acute distress. Appearance: She is well-developed. HENT:      Head: Normocephalic and atraumatic. Right Ear: External ear normal.      Left Ear: External ear normal.   Eyes:      General: No scleral icterus. Extraocular Movements: Extraocular movements intact. Neck:      Thyroid: No thyromegaly. Cardiovascular:      Rate and Rhythm: Normal rate and regular rhythm. Heart sounds: No murmur heard. Pulmonary:      Effort: No respiratory distress. Breath sounds: Normal breath sounds. No wheezing or rales. Abdominal:      General: Bowel sounds are normal. There is no distension. Palpations: Abdomen is soft. Tenderness: There is no abdominal tenderness. Musculoskeletal:         General: Normal range of motion. Cervical back: Normal range of motion. Right lower leg: No edema. Left lower leg: No edema. Skin:     General: Skin is warm and dry. Neurological:      Mental Status: She is alert and oriented to person, place, and time. Cranial Nerves:  No cranial nerve deficit. Sensory: No sensory deficit. Coordination: Coordination normal.   Psychiatric:         Behavior: Behavior normal.                Assessment/Plan:        1. Preop examination     Pre-Operative Risk assessment using 2014 ACC/AHA guidelines      Emergent procedure No  Active Cardiac Condition No (decompensated HF, Arrhythmia, MI <3 weeks, severe valve disease)  Risk Level of Procedure Low Risk (endoscopy, superficial skin, breast, ambulatory, or cataract, etc.)  Revised Cardiac Risk Index Risk factors: None  Measurement of Exercise Tolerance before Surgery >4 Yes     According to the 2014 ACC/AHA pre-operative risk assessment guidelines Gib Salm is a low risk for major cardiac complications during a low risk procedure and may continue as planned.  Specific medication recommendations: May skip her routine medications morning of surgery.

## 2021-10-13 ENCOUNTER — NURSE ONLY (OUTPATIENT)
Dept: ENDOCRINOLOGY | Age: 66
End: 2021-10-13
Payer: MEDICARE

## 2021-10-13 DIAGNOSIS — M81.0 OSTEOPOROSIS, POSTMENOPAUSAL: ICD-10-CM

## 2021-10-13 PROCEDURE — 96372 THER/PROPH/DIAG INJ SC/IM: CPT | Performed by: INTERNAL MEDICINE

## 2022-03-07 ENCOUNTER — OFFICE VISIT (OUTPATIENT)
Dept: ENDOCRINOLOGY | Age: 67
End: 2022-03-07
Payer: MEDICARE

## 2022-03-07 ENCOUNTER — HOSPITAL ENCOUNTER (OUTPATIENT)
Dept: GENERAL RADIOLOGY | Age: 67
Discharge: HOME | End: 2022-03-07
Payer: MEDICARE

## 2022-03-07 ENCOUNTER — PROCEDURE VISIT (OUTPATIENT)
Dept: ENDOCRINOLOGY | Age: 67
End: 2022-03-07

## 2022-03-07 VITALS
WEIGHT: 181.2 LBS | HEIGHT: 68 IN | DIASTOLIC BLOOD PRESSURE: 77 MMHG | SYSTOLIC BLOOD PRESSURE: 125 MMHG | BODY MASS INDEX: 27.46 KG/M2

## 2022-03-07 DIAGNOSIS — M81.0 OSTEOPOROSIS, POSTMENOPAUSAL: Primary | ICD-10-CM

## 2022-03-07 DIAGNOSIS — Z51.81 MEDICATION MONITORING ENCOUNTER: ICD-10-CM

## 2022-03-07 DIAGNOSIS — M81.0 OSTEOPOROSIS, POSTMENOPAUSAL: ICD-10-CM

## 2022-03-07 PROCEDURE — 1036F TOBACCO NON-USER: CPT | Performed by: INTERNAL MEDICINE

## 2022-03-07 PROCEDURE — 1090F PRES/ABSN URINE INCON ASSESS: CPT | Performed by: INTERNAL MEDICINE

## 2022-03-07 PROCEDURE — G8484 FLU IMMUNIZE NO ADMIN: HCPCS | Performed by: INTERNAL MEDICINE

## 2022-03-07 PROCEDURE — 99214 OFFICE O/P EST MOD 30 MIN: CPT | Performed by: INTERNAL MEDICINE

## 2022-03-07 PROCEDURE — 1123F ACP DISCUSS/DSCN MKR DOCD: CPT | Performed by: INTERNAL MEDICINE

## 2022-03-07 PROCEDURE — G8417 CALC BMI ABV UP PARAM F/U: HCPCS | Performed by: INTERNAL MEDICINE

## 2022-03-07 PROCEDURE — 77080 DXA BONE DENSITY AXIAL: CPT

## 2022-03-07 PROCEDURE — G8427 DOCREV CUR MEDS BY ELIG CLIN: HCPCS | Performed by: INTERNAL MEDICINE

## 2022-03-07 PROCEDURE — 3017F COLORECTAL CA SCREEN DOC REV: CPT | Performed by: INTERNAL MEDICINE

## 2022-03-07 PROCEDURE — G8399 PT W/DXA RESULTS DOCUMENT: HCPCS | Performed by: INTERNAL MEDICINE

## 2022-03-07 PROCEDURE — 4040F PNEUMOC VAC/ADMIN/RCVD: CPT | Performed by: INTERNAL MEDICINE

## 2022-03-07 PROCEDURE — 77080 DXA BONE DENSITY AXIAL: CPT | Performed by: INTERNAL MEDICINE

## 2022-03-07 NOTE — PROGRESS NOTES
[facility]) Osteoporosis and [address] [place] [address]  Phone [phone]  Fax [phone]    NAME: Marge Pettit OF BIRTH: 1955  INITIAL CONSULTATION: 06/18/2008  LAST OFFICE VISIT: 08/31/2020  TODAY'S VISIT: 03/07/2022    Labs @ [facility] 04/2021    PROBLEMS:   Osteoporosis by DXA 11/2007, lowest T-score -3.0, Z-score -2.0 in the spine (L2-L4)    Family history of osteoporosis, mother with vertebral compression fractures    Low bone density by DXA 05/2002, lowest Z-score -1.8 in the spine (L2-L4)    BMD decreased 2188-3603 and further 5982-6380 while on Fosamax    BMD decreased 9495-4249, lowest T-score -2.3 in the spine    Stress fracture of foot 2008, 60-mile breast cancer walk    Coccyx fracture 11/2009 (fell)    07/2021 LS MR old mild fx T12, L1    10/2021 ankle fracture (twisted playing pickleball); surgical intervention, slow to heal  Natural menopause age 46 (2004)  Disc disease, thoracic spine surgery 1997 and 2006  NEW 07/2019: sinus infection => bronchitis => pneumonia (prednisone since ~08/07/201    CURRENT MANAGEMENT FOR BONE HEALTH:   Calcium: 300 mg/d diet + 1330 mg/d supplements = 1630 mg/d  Vitamin D: 800 IU/d multivitamin + 800 IU/d with calcium = 1600 IU/d    25-OH D 53 ng/mL 10/2021  Exercise:  3 x weekly, walks 2 x weekly  Pharmacologic therapy: Prolia 60 mg SQ twice yearly started 01/2019    PREVIOUS MEDICATIONS FOR OSTEOPOROSIS:   Fosamax 8817-4424, stopped due to duration of treatment  alendronate 70 mg weekly started back 2014 by Dr. Keron Garcia, stopped 01/2018 by Dr. Keron Garcia due to GERD symptoms that were not limited to the day of the week she took alendronate    OTHER CURRENT MEDICATIONS (SELECTED): Fluticasone nasal spray  OTC MEDICATIONS: Fish oil, glucosamine    CHIEF COMPLAINT: Here for f/u of low bone density and fractures, monitoring treatment. No new related signs or symptoms.     PAST MEDICAL HISTORY, FAMILY HISTORY, SOCIAL HISTORY AND REVIEW OF SYSTEMS:  Relevant changes since last visit (see patient questionnaire of todays date). INTERVAL HISTORY:  See problem list for chronic/inactive conditions. She received Prolia without side effects. 10/2021 she was playing pickleball and twisted her ankle. Initial x-ray was negative but pain persisted and MRI showed a fracture that required surgical intervention and was slow to heal. Back pain radiating down leg  likely will need disc surgery soon    NEUROLOGIC EXAM: Able to rise from chair without using arms. No apparent focal motor or sensory deficit. Reflexes brisk and symmetric. Coordination appears normal.  MUSCULOSKELETAL EXAM: Gait: Intact without difficulty. Steady without assistance. Spine: Spinal contours are normal.  No spine tenderness to palpation or percussion. Ribs and pelvis: Ribs appear normal. Two finger spaces between ribs and pelvis. BONE DENSITY: Most recent done here using [employer] equipment. I deleted L2 starting in 2015 due to T-score discrepancy. L1 had been deleted on previous studies. T-scores  Initial study: 11/26/2007 L3-L4 -3.3 left fem. neck -1.5   Current study: 03/07/2022 L3-L4 -1.7 left fem. neck -0.7     The table below shows bone mineral density (grams/cm2), the appropriate measure for comparing serial scans. An increase or decrease is significant based on precision studies done at our center according to the ISCD protocol. PA spine Proximal Femur (left)   Date L3-L4 Fem. neck Trochanter Total hip   11/26/2007 0.736 0.696 0.624 0.829   11/26/2008 0.850 0.707 0.654 0.869   05/20/2014 0.780 0.688 0.589 Invalid   06/18/2018 0.842 0.693 0.637 0.860   08/20/2019 0.788 0.716 0.639 0.854   08/31/2020 0.879 0.723 0.643 0.859   03/07/2022 0.918 0.775 0.643 0.883     IMPRESSION:  BONE DENSITY IS LOW, CONSISTENT WITH OSTEOPOROSIS.  SINCE THE LAST DXA, BMD INCREASED IN THE SPINE AND FEMORAL NECK AND IS TRENDING UP IN THE TOTAL HIP. COMPARED WITH 06/2018, BEFORE STARTING PROLIA, BMD IS HIGHER NOW IN THE SPINE AND FEMORAL NECK AND TRENDING UP IN THE TOTAL HIP. LABS. 07/2015, Ca 10.2, Cr 0.9. 05/2017 Ca 9.8 Cr 0.9. 01/2018 TSH. 95.2918 Ca 8.6 Cr 0.8.  04/2021 Ca 9.0 Cr 0.8. IMAGING. DXA printouts reviewed. 02/2018 CXR. 07/2021 LS MR old mild fx T12, L1.    ASSESSMENT: Osteoporosis with bone density lower than expected and lower than expected for her age and sex due in part to family history. Spine BMD decreased from 2006 to 2007 while on bisphosphonate therapy. Spine BMD decreased from 2006 to 2007 and we recommended Forteo which she declined. Spine BMD has been stable since 2007 but femoral neck BMD decreased 2008 to 2009. She is doing find back on treatment with alendronate since 2014 (but off since 01/2018 due to GI side effects). She is doing OK with Prolia started 01/2019. PLANS:   Continue Prolia 60 mg SQ twice yearly (next dose due on/after 04/13/2022). Return appointment in 1 year with DXA. Time spent today: 30-39 minutes. Hyacinth Sharp MD, Director, [facility]) Osteoporosis and [address] [place] [address]  [email or web address]    CC: Griffin No MD

## 2022-03-07 NOTE — RESULT ENCOUNTER NOTE
[facility] ([facility]) Osteoporosis and [address] [facility] [address] [facility] [address] [facility], [address] [place] [address],[place] [address]  Phone [phone]  Fax [phone]    NAME: Bella Gamble   : 1955   STUDY DATE: 2022     REFERRING PROVIDER: Shannan Smith MD    INDICATION(S) FOR PERFORMING THE STUDY:  osteoporosis, age related (M81.0)    CLINICAL INFORMATION PROVIDED BY THE PATIENT: 58-year-old woman. Natural menopause was age 46. Ankle fracture 10/2021 (twisted playing pickleball). No long-term corticosteroid use. She took Fosamax 2362-9472 and again -2018. Current treatment is Prolia started 2019. Family history of osteoporosis (mother). EQUIPMENT: [employer]. POSITIONING: Good. REGIONS OF INTEREST: Correct. ARTIFACTS: None. STUDY VALID? Yes. Spine BMD is spuriously high because of generalized degenerative change. I deleted L2 starting in  due to T-score discrepancy. L1 had been deleted on previous studies. In  the bottom of the total hip box was too low. T-scores  Initial study: 2007 L3-L4 -3.3 left fem. neck -1.5   Current study: 2022 L3-L4 -1.7 left fem. neck -0.7     The table below shows bone mineral density (grams/cm2), the appropriate measure for comparing serial scans. An increase or decrease is significant based on precision studies done at our center according to the ISCD protocol. PA spine Proximal Femur (left)   Date L3-L4 Fem. neck Trochanter Total hip   2007 0.736 0.696 0.624 0.829   2008 0.850 0.707 0.654 0.869   2014 0.780 0.688 0.589 Invalid   2018 0.842 0.693 0.637 0.860   2019 0.788 0.716 0.639 0.854   2020 0.879 0.723 0.643 0.859   2022 0.918 0.775 0.643 0.883     IMPRESSION:  BONE DENSITY IS LOW, CONSISTENT WITH OSTEOPOROSIS.  SINCE THE LAST DXA, BMD INCREASED IN THE SPINE AND FEMORAL NECK AND IS TRENDING UP IN THE TOTAL HIP. COMPARED WITH 2018, BEFORE STARTING PROLIA, BMD IS HIGHER NOW IN THE SPINE AND FEMORAL NECK AND TRENDING UP IN THE TOTAL HIP. Consider repeating this study in 1-2 years to assess the patient's progress. _________________________________________________   Ruth Ann Sharp MD, Director, [facility]) Osteoporosis and [address] [place] [address]

## 2022-04-14 ENCOUNTER — NURSE ONLY (OUTPATIENT)
Dept: ENDOCRINOLOGY | Age: 67
End: 2022-04-14
Payer: MEDICARE

## 2022-04-14 DIAGNOSIS — M81.0 OSTEOPOROSIS, POSTMENOPAUSAL: ICD-10-CM

## 2022-04-14 PROCEDURE — 96372 THER/PROPH/DIAG INJ SC/IM: CPT | Performed by: INTERNAL MEDICINE

## 2022-04-14 NOTE — PROGRESS NOTES
Prolia supplied by the physician office. Informed patient if any signs of redness,rash,swelling or unusual symptoms occur, please contact the office. Prolia given per physician order. It is the patient's responsibility to notify the physician office of new insurance plan or new identification number prior to appointment to prevent delay in treatment. Please send a copy of the front and back of the insurance card either by fax, mail or stop by the office.

## 2022-11-02 ENCOUNTER — NURSE ONLY (OUTPATIENT)
Dept: ENDOCRINOLOGY | Age: 67
End: 2022-11-02
Payer: MEDICARE

## 2022-11-02 DIAGNOSIS — M81.0 OSTEOPOROSIS, POSTMENOPAUSAL: Primary | ICD-10-CM

## 2022-11-02 PROCEDURE — 96372 THER/PROPH/DIAG INJ SC/IM: CPT | Performed by: INTERNAL MEDICINE

## 2023-02-14 ENCOUNTER — TELEPHONE (OUTPATIENT)
Dept: INTERNAL MEDICINE CLINIC | Age: 68
End: 2023-02-14

## 2023-02-14 DIAGNOSIS — E55.9 VITAMIN D DEFICIENCY: ICD-10-CM

## 2023-02-14 DIAGNOSIS — M81.0 OSTEOPOROSIS, UNSPECIFIED OSTEOPOROSIS TYPE, UNSPECIFIED PATHOLOGICAL FRACTURE PRESENCE: ICD-10-CM

## 2023-02-14 DIAGNOSIS — Z00.00 ROUTINE GENERAL MEDICAL EXAMINATION AT A HEALTH CARE FACILITY: Primary | ICD-10-CM

## 2023-02-14 NOTE — TELEPHONE ENCOUNTER
----- Message from Dwayne No sent at 2/14/2023  4:17 PM EST -----  Subject: Referral Request    Reason for referral request? The pt called requesting orders for blood   work. She is wanting to have this done prior to her appt on 3/3/2023. She   would like to have the results when she goes to her appt on 3/3/2023. please contact the pt when the orders are ready. she is requesting these   this week. She will be out of town after 2/17/2023  Provider patient wants to be referred to(if known):     Provider Phone Number(if known):     Additional Information for Provider?   ---------------------------------------------------------------------------  --------------  [facility]    [phone]; OK to leave message on voicemail  ---------------------------------------------------------------------------  --------------

## 2023-02-15 DIAGNOSIS — Z00.00 ROUTINE GENERAL MEDICAL EXAMINATION AT A HEALTH CARE FACILITY: ICD-10-CM

## 2023-02-15 DIAGNOSIS — M81.0 OSTEOPOROSIS, UNSPECIFIED OSTEOPOROSIS TYPE, UNSPECIFIED PATHOLOGICAL FRACTURE PRESENCE: ICD-10-CM

## 2023-02-15 LAB
A/G RATIO: 1.7 (ref 1.1–2.2)
ALBUMIN SERPL-MCNC: 4.5 G/DL (ref 3.4–5)
ALP BLD-CCNC: 69 U/L (ref 40–129)
ALT SERPL-CCNC: 24 U/L (ref 10–40)
ANION GAP SERPL CALCULATED.3IONS-SCNC: 10 MMOL/L (ref 3–16)
AST SERPL-CCNC: 19 U/L (ref 15–37)
BILIRUB SERPL-MCNC: <0.2 MG/DL (ref 0–1)
BUN BLDV-MCNC: 20 MG/DL (ref 7–20)
CALCIUM SERPL-MCNC: 10.5 MG/DL (ref 8.3–10.6)
CHLORIDE BLD-SCNC: 102 MMOL/L (ref 99–110)
CHOLESTEROL, TOTAL: 229 MG/DL (ref 0–199)
CO2: 29 MMOL/L (ref 21–32)
CREAT SERPL-MCNC: 0.9 MG/DL (ref 0.6–1.2)
GFR SERPL CREATININE-BSD FRML MDRD: >60 ML/MIN/{1.73_M2}
GLUCOSE BLD-MCNC: 143 MG/DL (ref 70–99)
HDLC SERPL-MCNC: 66 MG/DL (ref 40–60)
LDL CHOLESTEROL CALCULATED: 109 MG/DL
POTASSIUM SERPL-SCNC: 4.6 MMOL/L (ref 3.5–5.1)
SODIUM BLD-SCNC: 141 MMOL/L (ref 136–145)
TOTAL PROTEIN: 7.2 G/DL (ref 6.4–8.2)
TRIGL SERPL-MCNC: 269 MG/DL (ref 0–150)
VITAMIN D 25-HYDROXY: 41.2 NG/ML
VLDLC SERPL CALC-MCNC: 54 MG/DL

## 2023-02-16 LAB
ESTIMATED AVERAGE GLUCOSE: 114 MG/DL
HBA1C MFR BLD: 5.6 %

## 2023-03-03 ENCOUNTER — OFFICE VISIT (OUTPATIENT)
Dept: INTERNAL MEDICINE CLINIC | Age: 68
End: 2023-03-03

## 2023-03-03 VITALS
WEIGHT: 189.2 LBS | BODY MASS INDEX: 29.7 KG/M2 | OXYGEN SATURATION: 97 % | HEIGHT: 67 IN | DIASTOLIC BLOOD PRESSURE: 78 MMHG | HEART RATE: 69 BPM | SYSTOLIC BLOOD PRESSURE: 124 MMHG

## 2023-03-03 DIAGNOSIS — E78.5 HYPERLIPIDEMIA, UNSPECIFIED HYPERLIPIDEMIA TYPE: ICD-10-CM

## 2023-03-03 DIAGNOSIS — L72.3 SEBACEOUS CYST: ICD-10-CM

## 2023-03-03 DIAGNOSIS — G47.09 OTHER INSOMNIA: ICD-10-CM

## 2023-03-03 DIAGNOSIS — Z00.00 MEDICARE ANNUAL WELLNESS VISIT, SUBSEQUENT: Primary | ICD-10-CM

## 2023-03-03 DIAGNOSIS — R63.5 ABNORMAL WEIGHT GAIN: ICD-10-CM

## 2023-03-03 DIAGNOSIS — R23.2 HOT FLASHES: ICD-10-CM

## 2023-03-03 DIAGNOSIS — R22.32 FOREARM MASS, LEFT: ICD-10-CM

## 2023-03-03 RX ORDER — IBUPROFEN 200 MG
400 TABLET ORAL EVERY 12 HOURS PRN
Qty: 120 TABLET | Refills: 0 | COMMUNITY
Start: 2023-03-03 | End: 2023-04-02

## 2023-03-03 RX ORDER — ACETAMINOPHEN 500 MG
1000 TABLET ORAL 2 TIMES DAILY
Qty: 360 TABLET | Refills: 1 | COMMUNITY
Start: 2023-03-03

## 2023-03-03 SDOH — ECONOMIC STABILITY: HOUSING INSECURITY
IN THE LAST 12 MONTHS, WAS THERE A TIME WHEN YOU DID NOT HAVE A STEADY PLACE TO SLEEP OR SLEPT IN A SHELTER (INCLUDING NOW)?: NO

## 2023-03-03 SDOH — ECONOMIC STABILITY: FOOD INSECURITY: WITHIN THE PAST 12 MONTHS, YOU WORRIED THAT YOUR FOOD WOULD RUN OUT BEFORE YOU GOT MONEY TO BUY MORE.: NEVER TRUE

## 2023-03-03 SDOH — ECONOMIC STABILITY: FOOD INSECURITY: WITHIN THE PAST 12 MONTHS, THE FOOD YOU BOUGHT JUST DIDN'T LAST AND YOU DIDN'T HAVE MONEY TO GET MORE.: NEVER TRUE

## 2023-03-03 SDOH — ECONOMIC STABILITY: INCOME INSECURITY: HOW HARD IS IT FOR YOU TO PAY FOR THE VERY BASICS LIKE FOOD, HOUSING, MEDICAL CARE, AND HEATING?: NOT HARD AT ALL

## 2023-03-03 ASSESSMENT — PATIENT HEALTH QUESTIONNAIRE - PHQ9
7. TROUBLE CONCENTRATING ON THINGS, SUCH AS READING THE NEWSPAPER OR WATCHING TELEVISION: 0
SUM OF ALL RESPONSES TO PHQ9 QUESTIONS 1 & 2: 0
SUM OF ALL RESPONSES TO PHQ QUESTIONS 1-9: 3
1. LITTLE INTEREST OR PLEASURE IN DOING THINGS: 0
3. TROUBLE FALLING OR STAYING ASLEEP: 3
8. MOVING OR SPEAKING SO SLOWLY THAT OTHER PEOPLE COULD HAVE NOTICED. OR THE OPPOSITE, BEING SO FIGETY OR RESTLESS THAT YOU HAVE BEEN MOVING AROUND A LOT MORE THAN USUAL: 0
6. FEELING BAD ABOUT YOURSELF - OR THAT YOU ARE A FAILURE OR HAVE LET YOURSELF OR YOUR FAMILY DOWN: 0
2. FEELING DOWN, DEPRESSED OR HOPELESS: 0
10. IF YOU CHECKED OFF ANY PROBLEMS, HOW DIFFICULT HAVE THESE PROBLEMS MADE IT FOR YOU TO DO YOUR WORK, TAKE CARE OF THINGS AT HOME, OR GET ALONG WITH OTHER PEOPLE: 0
5. POOR APPETITE OR OVEREATING: 0
9. THOUGHTS THAT YOU WOULD BE BETTER OFF DEAD, OR OF HURTING YOURSELF: 0
SUM OF ALL RESPONSES TO PHQ QUESTIONS 1-9: 3
4. FEELING TIRED OR HAVING LITTLE ENERGY: 0

## 2023-03-03 ASSESSMENT — LIFESTYLE VARIABLES
HOW OFTEN DO YOU HAVE A DRINK CONTAINING ALCOHOL: MONTHLY OR LESS
HOW MANY STANDARD DRINKS CONTAINING ALCOHOL DO YOU HAVE ON A TYPICAL DAY: 1 OR 2

## 2023-03-03 NOTE — PATIENT INSTRUCTIONS
[facility] at pharmacy    Shower safety    Ekg    Cardiac ct at [address] but if not covered call [phone]    Checking labs but if all okay and symptoms persist, let me know re: sweating    Sleep study    Surgery referral       Preventing Falls: Care Instructions  Overview     Getting around your home safely can be a challenge if you have injuries or health problems that make it easy for you to fall. Loose rugs and furniture in walkways are among the dangers for many older people who have problems walking or who have poor eyesight. People who have conditions such as arthritis, osteoporosis, or dementia also have to be careful not to fall. You can make your home safer with a few simple measures. Follow-up care is a key part of your treatment and safety. Be sure to make and go to all appointments, and call your doctor if you are having problems. It's also a good idea to know your test results and keep a list of the medicines you take. How can you care for yourself at home? Taking care of yourself  Exercise regularly to improve your strength, muscle tone, and balance. Walk if you can. Swimming may be a good choice if you cannot walk easily. Have your vision and hearing checked each year or any time you notice a change. If you have trouble seeing and hearing, you might not be able to avoid objects and could lose your balance. Know the side effects of the medicines you take. Ask your doctor or pharmacist whether the medicines you take can affect your balance. Sleeping pills or sedatives can affect your balance. Limit the amount of alcohol you drink. Alcohol can impair your balance and other senses. Ask your doctor whether calluses or corns on your feet need to be removed. If you wear loose-fitting shoes because of calluses or corns, you can lose your balance and fall. Talk to your doctor if you have numbness in your feet. You may get dizzy if you do not drink enough water.  To prevent dehydration, drink plenty of fluids. Choose water and other clear liquids. If you have kidney, heart, or liver disease and have to limit fluids, talk with your doctor before you increase the amount of fluids you drink. Preventing falls at home  Remove raised doorway thresholds, throw rugs, and clutter. Repair loose carpet or raised areas in the floor. Move furniture and electrical cords to keep them out of walking paths. Use nonskid floor wax, and wipe up spills right away, especially on ceramic tile floors. If you use a walker or cane, put rubber tips on it. If you use crutches, clean the bottoms of them regularly with an abrasive pad, such as steel wool. Keep your house well lit, especially stairways, porches, and outside walkways. Use night-lights in areas such as hallways and bathrooms. Add extra light switches or use remote switches (such as switches that go on or off when you clap your hands) to make it easier to turn lights on if you have to get up during the night. Install sturdy handrails on stairways. Move items in your cabinets so that the things you use a lot are on the lower shelves (about waist level). Keep a cordless phone and a flashlight with new batteries by your bed. If possible, put a phone in each of the main rooms of your house, or carry a cell phone in case you fall and cannot reach a phone. Or, you can wear a device around your neck or wrist. You push a button that sends a signal for help. Wear low-heeled shoes that fit well and give your feet good support. Use footwear with nonskid soles. Check the heels and soles of your shoes for wear. Repair or replace worn heels or soles. Do not wear socks without shoes on smooth floors, such as wood. Walk on the grass when the sidewalks are slippery. If you live in an area that gets snow and ice in the winter, sprinkle salt on slippery steps and sidewalks. Or ask a family member or friend to do this for you.   Preventing falls in the bath  Install grab bars and nonskid mats inside and outside your shower or tub and near the toilet and sinks. Use shower chairs and bath benches. Use a hand-held shower head that will allow you to sit while showering. Get into a tub or shower by putting the weaker leg in first. Get out of a tub or shower with your strong side first.  Repair loose toilet seats and consider installing a raised toilet seat to make getting on and off the toilet easier. Keep your bathroom door unlocked while you are in the shower. Where can you learn more? Go to http://www.morrell.com/ and enter G117 to learn more about \"Preventing Falls: Care Instructions. \"  Current as of: May 4, 2022               Content Version: 13.5  © 7306-2500 Healthwise, FileThis. Care instructions adapted under license by [facility] ([facility]). If you have questions about a medical condition or this instruction, always ask your healthcare professional. [place] [address] any warranty or liability for your use of this information. Hearing Loss: Care Instructions  Overview     Hearing loss is a sudden or slow decrease in how well you hear. It can range from mild to severe. Permanent hearing loss can occur with aging. It also can happen when you are exposed long-term to loud noise. Examples include listening to loud music, riding motorcycles, or being around other loud machines. Hearing loss can affect your work and home life. It can make you feel lonely or depressed. You may feel that you have lost your independence. But hearing aids and other devices can help you hear better and feel connected to others. Follow-up care is a key part of your treatment and safety. Be sure to make and go to all appointments, and call your doctor if you are having problems. It's also a good idea to know your test results and keep a list of the medicines you take. How can you care for yourself at home? Avoid loud noises whenever possible.  This helps keep your hearing from getting worse. Always wear hearing protection around loud noises. Wear a hearing aid as directed. See a professional who can help you pick a hearing aid that fits you. Have hearing tests as your doctor suggests. They can show whether your hearing has changed. Your hearing aid may need to be adjusted. Use other devices as needed. These may include:  Telephone amplifiers and hearing aids that can connect to a television, stereo, radio, or microphone. Devices that use lights or vibrations. These alert you to the doorbell, a ringing telephone, or a baby monitor. Television closed-captioning. This shows the words at the bottom of the screen. Most new TVs can do this. TTY (text telephone). This lets you type messages back and forth on the telephone instead of talking or listening. These devices are also called TDD. When messages are typed on the keyboard, they are sent over the phone line to a receiving TTY. The message is shown on a monitor. Use text messaging, social media, and email if it is hard for you to communicate by telephone. Try to learn a listening technique called speechreading. It is not lipreading. You pay attention to people's gestures, expressions, posture, and tone of voice. These clues can help you understand what a person is saying. Face the person you are talking to, and have them face you. Make sure the lighting is good. You need to see the other person's face clearly. Think about counseling if you need help to adjust to your hearing loss. When should you call for help? Watch closely for changes in your health, and be sure to contact your doctor if:    You think your hearing is getting worse. You have new symptoms, such as dizziness or nausea. Where can you learn more? Go to http://www.morrell.com/ and enter R798 to learn more about \"Hearing Loss: Care Instructions. \"  Current as of:  May 4, 2022               Content Version: 13.5  © 5317-9347 Healthwise, Incorporated. Care instructions adapted under license by [facility] ([facility]). If you have questions about a medical condition or this instruction, always ask your healthcare professional. [address] any warranty or liability for your use of this information. Advance Directives: Care Instructions  Overview  An advance directive is a legal way to state your wishes at the end of your life. It tells your family and your doctor what to do if you can't say what you want. There are two main types of advance directives. You can change them any time your wishes change. Living will. This form tells your family and your doctor your wishes about life support and other treatment. The form is also called a declaration. Medical power of . This form lets you name a person to make treatment decisions for you when you can't speak for yourself. This person is called a health care agent (health care proxy, health care surrogate). The form is also called a durable power of  for health care. If you do not have an advance directive, decisions about your medical care may be made by a family member, or by a doctor or a  who doesn't know you. It may help to think of an advance directive as a gift to the people who care for you. If you have one, they won't have to make tough decisions by themselves. For more information, including forms for your state, see the [address] website (www.caringinfo.org/planning/advance-directives/). Follow-up care is a key part of your treatment and safety. Be sure to make and go to all appointments, and call your doctor if you are having problems. It's also a good idea to know your test results and keep a list of the medicines you take. What should you include in an advance directive? Many states have a unique advance directive form. (It may ask you to address specific issues.) Or you might use a universal form that's approved by many states.   If your form doesn't tell you what to address, it may be hard to know what to include in your advance directive. Use the questions below to help you get started. Who do you want to make decisions about your medical care if you are not able to? What life-support measures do you want if you have a serious illness that gets worse over time or can't be cured? What are you most afraid of that might happen? (Maybe you're afraid of having pain, losing your independence, or being kept alive by machines.)  Where would you prefer to die? (Your home? A hospital? A nursing home?)  Do you want to donate your organs when you die? Do you want certain Jehovah's witness practices performed before you die? When should you call for help? Be sure to contact your doctor if you have any questions. Where can you learn more? Go to http://[employer].morrell.com/ and enter R264 to learn more about \"Advance Directives: Care Instructions. \"  Current as of: June 16, 2022               Content Version: 13.5  © 2006-2022 [employer]. Care instructions adapted under license by [facility] ([facility]). If you have questions about a medical condition or this instruction, always ask your healthcare professional. [place] [address] any warranty or liability for your use of this information. A Healthy Heart: Care Instructions  Your Care Instructions     Coronary artery disease, also called heart disease, occurs when a substance called plaque builds up in the vessels that supply oxygen-rich blood to your heart muscle. This can narrow the blood vessels and reduce blood flow. A heart attack happens when blood flow is completely blocked. A high-fat diet, smoking, and other factors increase the risk of heart disease. Your doctor has found that you have a chance of having heart disease. You can do lots of things to keep your heart healthy. It may not be easy, but you can change your diet, exercise more, and quit smoking.  These steps really work to lower your chance of heart disease. Follow-up care is a key part of your treatment and safety. Be sure to make and go to all appointments, and call your doctor if you are having problems. It's also a good idea to know your test results and keep a list of the medicines you take. How can you care for yourself at home? Diet    Use less salt when you cook and eat. This helps lower your blood pressure. Taste food before salting. Add only a little salt when you think you need it. With time, your taste buds will adjust to less salt. Eat fewer snack items, fast foods, canned soups, and other high-salt, high-fat, processed foods. Read food labels and try to avoid saturated and trans fats. They increase your risk of heart disease by raising cholesterol levels. Limit the amount of solid fat-butter, margarine, and shortening-you eat. Use olive, peanut, or canola oil when you cook. Bake, broil, and steam foods instead of frying them. Eat a variety of fruit and vegetables every day. Dark green, deep orange, red, or yellow fruits and vegetables are especially good for you. Examples include spinach, carrots, peaches, and berries. Foods high in fiber can reduce your cholesterol and provide important vitamins and minerals. High-fiber foods include whole-grain cereals and breads, oatmeal, beans, brown rice, citrus fruits, and apples. Eat lean proteins. Heart-healthy proteins include seafood, lean meats and poultry, eggs, beans, peas, nuts, seeds, and soy products. Limit drinks and foods with added sugar. These include candy, desserts, and soda pop. Lifestyle changes    If your doctor recommends it, get more exercise. Walking is a good choice. Bit by bit, increase the amount you walk every day. Try for at least 30 minutes on most days of the week. You also may want to swim, bike, or do other activities. Do not smoke.  If you need help quitting, talk to your doctor about stop-smoking programs and medicines. These can increase your chances of quitting for good. Quitting smoking may be the most important step you can take to protect your heart. It is never too late to quit. Limit alcohol to 2 drinks a day for men and 1 drink a day for women. Too much alcohol can cause health problems. Manage other health problems such as diabetes, high blood pressure, and high cholesterol. If you think you may have a problem with alcohol or drug use, talk to your doctor. Medicines    Take your medicines exactly as prescribed. Call your doctor if you think you are having a problem with your medicine. If your doctor recommends aspirin, take the amount directed each day. Make sure you take aspirin and not another kind of pain reliever, such as acetaminophen (Tylenol). When should you call for help? Call 911 if you have symptoms of a heart attack. These may include:    Chest pain or pressure, or a strange feeling in the chest.     Sweating. Shortness of breath. Pain, pressure, or a strange feeling in the back, neck, jaw, or upper belly or in one or both shoulders or arms. Lightheadedness or sudden weakness. A fast or irregular heartbeat. After you call 911, the  may tell you to chew 1 adult-strength or 2 to 4 low-dose aspirin. Wait for an ambulance. Do not try to drive yourself. Watch closely for changes in your health, and be sure to contact your doctor if you have any problems. Where can you learn more? Go to http://www.morrell.com/ and enter F075 to learn more about \"A Healthy Heart: Care Instructions. \"  Current as of: September 7, 2022               Content Version: 13.5  © 5894-5950 Healthwise, Incorporated. Care instructions adapted under license by [facility] [address]  If you have questions about a medical condition or this instruction, always ask your healthcare professional. [place] [address] any warranty or liability for your use of this information. Personalized Preventive Plan for Perla Montlila - 3/3/2023  Medicare offers a range of preventive health benefits. Some of the tests and screenings are paid in full while other may be subject to a deductible, co-insurance, and/or copay. Some of these benefits include a comprehensive review of your medical history including lifestyle, illnesses that may run in your family, and various assessments and screenings as appropriate. After reviewing your medical record and screening and assessments performed today your provider may have ordered immunizations, labs, imaging, and/or referrals for you. A list of these orders (if applicable) as well as your Preventive Care list are included within your After Visit Summary for your review. Other Preventive Recommendations:    A preventive eye exam performed by an eye specialist is recommended every 1-2 years to screen for glaucoma; cataracts, macular degeneration, and other eye disorders. A preventive dental visit is recommended every 6 months. Try to get at least 150 minutes of exercise per week or 10,000 steps per day on a pedometer . Order or download the FREE \"Exercise & Physical Activity: Your Everyday Guide\" from [employer]. Call 0-458.827.9947 or search [employer] online. You need 0215-4647 mg of calcium and 5291-8724 IU of vitamin D per day. It is possible to meet your calcium requirement with diet alone, but a vitamin D supplement is usually necessary to meet this goal.  When exposed to the sun, use a sunscreen that protects against both UVA and UVB radiation with an SPF of 30 or greater. Reapply every 2 to 3 hours or after sweating, drying off with a towel, or swimming. Always wear a seat belt when traveling in a car. Always wear a helmet when riding a bicycle or motorcycle.

## 2023-03-03 NOTE — PROGRESS NOTES
Medicare Annual Wellness Visit    Dena Jauregui is here for Medicare AWV    Assessment & Plan   Medicare annual wellness visit, subsequent  Age and gender appropriate preventive healthcare addressed  Hyperlipidemia, unspecified hyperlipidemia type  -     EKG 12 Lead  -     CT CARDIAC CALCIUM SCORING; Future  Elevated risk score, consider statin  Hot flashes  -     CBC with Auto Differential; Future  -     TSH; Future  Abnormal weight gain  -     TSH; Future  Other insomnia  Due to interrupted sleep  -     [facility]  Sebaceous cyst  -     Lorena Howard MD, General Surgery, [place]  Forearm mass, left  -     Lorena Howard MD, General Surgery, [facility]    Recommendations for Preventive Services Due: see orders and patient instructions/AVS.  Recommended screening schedule for the next 5-10 years is provided to the patient in written form: see Patient Instructions/AVS.     Return in 1 year (on 3/3/2024) for Medicare Annual Wellness Visit in 1 year. Subjective       Patient has been addressing several orthopedic issues in recent years related to pickleball including an ankle fracture and sciatica. These are improving. She continues to play. She reports difficulty sleeping where she wakes up every 2 hours. She has tried melatonin and Gummies and generally does fall back to sleep. She reports some weight gain. She reports easy sweating in recent years. She has a really hard time handling the heat. She does have a lump behind her left ear and a bump of her left forearm. Is not sure how long they have been there but they do not seem to be growing. Patient's complete Health Risk Assessment and screening values have been reviewed and are found in Flowsheets. The following problems were reviewed today and where indicated follow up appointments were made and/or referrals ordered.     The 10-year ASCVD risk score (Dustin RODRIGUEZ, et al., 2019) is: 8.1%    Values used to calculate the score:      Age: 69 years      Sex: Female      Is Non- : No      Diabetic: No      Tobacco smoker: No      Systolic Blood Pressure: 347 mmHg      Is BP treated: No      HDL Cholesterol: 66 mg/dL      Total Cholesterol: 229 mg/dL        Positive Risk Factor Screenings with Interventions:    Fall Risk:  Do you feel unsteady or are you worried about falling? : no  2 or more falls in past year?: no  Fall with injury in past year?: (!) yes     Interventions:    Mechanical falls related to sports                  Safety:  Do you have either shower bars, grab bars, non-slip mats or non-slip surfaces in your shower or bathtub?: (!) No  Interventions:  Home safety tips provided                     Objective   Vitals:    03/03/23 1133   BP: 124/78   Pulse: 69   SpO2: 97%   Weight: 189 lb 3.2 oz (85.8 kg)   Height: 5' 7\" (1.702 m)      Body mass index is 29.63 kg/m². patient is no acute distress. Head is normocephalic atraumatic. There is a probable sebaceous cyst behind her left ear. Heart is regular rate and rhythm. Lungs are clear to auscultation. Abdomen is soft nontender nondistended. Calves are without edema. Gait is intact. There is a possible soft tissue mass of the left forearm. She is alert and oriented x3. Mood is normal.      No Known Allergies  Prior to Visit Medications    Medication Sig Taking?  Authorizing Provider   ibuprofen (ADVIL) 200 MG tablet Take 2 tablets by mouth every 12 hours as needed for Pain Yes Madhu Bolaños MD   acetaminophen (TYLENOL) 500 MG tablet Take 2 tablets by mouth in the morning and at bedtime Yes Madhu Bolaños MD   TURMERIC PO Take by mouth Yes Historical Provider, MD   CRANBERRY PO Take by mouth Yes Historical Provider, MD   Multiple Vitamins-Minerals (MULTIVITAMIN WOMEN) TABS Take by mouth Yes Historical Provider, MD   cetirizine (ZYRTEC) 10 MG tablet Take 10 mg by mouth Yes Historical Provider, MD PROLIA 60 MG/ML SOLN SC injection Inject 1 mL into the skin See Admin Instructions One dose every 6 months Yes Mitzy Mercedes MD   calcium carbonate (OSCAL) 500 MG TABS tablet Take 500 mg by mouth daily.  Yes Historical Provider, MD   vitamin D (CHOLECALCIFEROL) 1000 UNIT TABS tablet Take 2,000 Units by mouth daily  Yes Historical Provider, MD   butalbital-acetaminophen-caffeine (FIORICET, ESGIC) -40 MG per tablet Take 1 tablet by mouth every 6 hours as needed  Patient not taking: Reported on 3/3/2023  Historical Provider, MD Osman (Including outside providers/suppliers regularly involved in providing care):   Patient Care Team:  Maddi Garcia MD as PCP - General (Internal Medicine)  Maddi Garcia MD as PCP - Empaneled Provider  Rohan Scruggs (Ophthalmology)     Reviewed and updated this visit:  Tobacco  Allergies  Meds  Problems  Med Hx  Surg Hx  Soc Hx  Fam Hx               Sophy Syed MD

## 2023-04-17 ENCOUNTER — HOSPITAL ENCOUNTER (OUTPATIENT)
Dept: CT IMAGING | Age: 68
Discharge: HOME | End: 2023-04-17
Payer: MEDICARE

## 2023-04-17 DIAGNOSIS — E78.5 HYPERLIPIDEMIA, UNSPECIFIED HYPERLIPIDEMIA TYPE: ICD-10-CM

## 2023-04-17 PROCEDURE — 75571 CT HRT W/O DYE W/CA TEST: CPT

## 2023-04-19 ENCOUNTER — SCHEDULED TELEPHONE ENCOUNTER (OUTPATIENT)
Dept: INTERNAL MEDICINE CLINIC | Age: 68
End: 2023-04-19

## 2023-04-19 ENCOUNTER — TELEPHONE (OUTPATIENT)
Dept: INTERNAL MEDICINE CLINIC | Age: 68
End: 2023-04-19

## 2023-04-19 DIAGNOSIS — R91.1 LUNG NODULE SEEN ON IMAGING STUDY: ICD-10-CM

## 2023-04-19 DIAGNOSIS — R06.00 DYSPNEA, UNSPECIFIED TYPE: ICD-10-CM

## 2023-04-19 DIAGNOSIS — I50.9 OTHER CONGESTIVE HEART FAILURE (HCC): ICD-10-CM

## 2023-04-19 DIAGNOSIS — J90 PLEURAL EFFUSION: Primary | ICD-10-CM

## 2023-04-20 ENCOUNTER — HOSPITAL ENCOUNTER (OUTPATIENT)
Dept: CT IMAGING | Age: 68
Discharge: HOME | End: 2023-04-20
Payer: MEDICARE

## 2023-04-20 ENCOUNTER — TELEPHONE (OUTPATIENT)
Dept: INTERNAL MEDICINE CLINIC | Age: 68
End: 2023-04-20

## 2023-04-20 DIAGNOSIS — R06.09 DYSPNEA ON EXERTION: Primary | ICD-10-CM

## 2023-04-20 DIAGNOSIS — R91.1 LUNG NODULE SEEN ON IMAGING STUDY: ICD-10-CM

## 2023-04-20 PROCEDURE — 71250 CT THORAX DX C-: CPT

## 2023-04-20 NOTE — TELEPHONE ENCOUNTER
Selene Apley with [facility] is calling regarding patient order for an ECHO. She states patient told her Dr Laura Claire wanted her to have this done prior to her vacation at the end of this month. The soonest available they have is May 5. Selene Apley states the order either needs to be changed to STAT or she needs verbal confirmation that it is OK for patient to complete this after her vacation. Please contact  Selene Apley.

## 2023-04-21 ENCOUNTER — PROCEDURE VISIT (OUTPATIENT)
Dept: CARDIOLOGY CLINIC | Age: 68
End: 2023-04-21

## 2023-04-21 DIAGNOSIS — R06.09 DYSPNEA ON EXERTION: ICD-10-CM

## 2023-04-21 DIAGNOSIS — R91.8 LUNG NODULES: Primary | ICD-10-CM

## 2023-04-21 DIAGNOSIS — I31.39 PERICARDIAL EFFUSION: ICD-10-CM

## 2023-04-21 LAB
LV EF: 58 %
LVEF MODALITY: NORMAL

## 2023-04-24 ENCOUNTER — TELEPHONE (OUTPATIENT)
Dept: INTERNAL MEDICINE CLINIC | Age: 68
End: 2023-04-24

## 2023-04-24 DIAGNOSIS — R63.5 ABNORMAL WEIGHT GAIN: ICD-10-CM

## 2023-04-24 DIAGNOSIS — G43.809 OTHER MIGRAINE WITHOUT STATUS MIGRAINOSUS, NOT INTRACTABLE: Primary | ICD-10-CM

## 2023-04-24 DIAGNOSIS — I50.9 OTHER CONGESTIVE HEART FAILURE (HCC): ICD-10-CM

## 2023-04-24 DIAGNOSIS — R23.2 HOT FLASHES: ICD-10-CM

## 2023-04-24 LAB
BASOPHILS # BLD: 0 K/UL (ref 0–0.2)
BASOPHILS NFR BLD: 0.6 %
DEPRECATED RDW RBC AUTO: 13.6 % (ref 12.4–15.4)
EOSINOPHIL # BLD: 0.2 K/UL (ref 0–0.6)
EOSINOPHIL NFR BLD: 4.4 %
HCT VFR BLD AUTO: 41.6 % (ref 36–48)
HGB BLD-MCNC: 13.9 G/DL (ref 12–16)
LYMPHOCYTES # BLD: 2 K/UL (ref 1–5.1)
LYMPHOCYTES NFR BLD: 38.3 %
MCH RBC QN AUTO: 31.1 PG (ref 26–34)
MCHC RBC AUTO-ENTMCNC: 33.3 G/DL (ref 31–36)
MCV RBC AUTO: 93.3 FL (ref 80–100)
MONOCYTES # BLD: 0.4 K/UL (ref 0–1.3)
MONOCYTES NFR BLD: 7.6 %
NEUTROPHILS # BLD: 2.6 K/UL (ref 1.7–7.7)
NEUTROPHILS NFR BLD: 49.1 %
PLATELET # BLD AUTO: 286 K/UL (ref 135–450)
PMV BLD AUTO: 9.2 FL (ref 5–10.5)
RBC # BLD AUTO: 4.46 M/UL (ref 4–5.2)
WBC # BLD AUTO: 5.2 K/UL (ref 4–11)

## 2023-04-24 RX ORDER — BUTALBITAL, ACETAMINOPHEN AND CAFFEINE 50; 325; 40 MG/1; MG/1; MG/1
1 TABLET ORAL EVERY 6 HOURS PRN
Qty: 30 TABLET | Refills: 0 | Status: SHIPPED | OUTPATIENT
Start: 2023-04-24

## 2023-04-24 NOTE — TELEPHONE ENCOUNTER
Patient called back. She states she was concerned about needing to see the cardiologist asap but will reschedule for June. She is taking the medication for headaches, which she has every couple months. Please contact her with any additional questions.

## 2023-04-24 NOTE — TELEPHONE ENCOUNTER
LVM for the pt to verify if she is taking     butalbital-acetaminophen-caffeine (FIORICET, ESGIC) -40 MG per tablet   And is it for headaches and if so how often she is having headaches

## 2023-04-24 NOTE — TELEPHONE ENCOUNTER
Patient is requesting a call back from Dr Luis Serrano or MA when possible. Patient states she had an echocardiogram done and was advised based on the results to go see a cardiologist by Dr. Luis Serrano. Patient has an appt scheduled with her cardiologist this week but now has to attend a . She is wondering how important it is to go to this cardiology appointment? She states their next available appt is not until . She would like a call back when possible.

## 2023-04-25 LAB
NT-PROBNP SERPL-MCNC: <36 PG/ML (ref 0–124)
TSH SERPL DL<=0.005 MIU/L-ACNC: 1.89 UIU/ML (ref 0.27–4.2)

## 2023-05-16 ENCOUNTER — HOSPITAL ENCOUNTER (OUTPATIENT)
Dept: GENERAL RADIOLOGY | Age: 68
Discharge: HOME | End: 2023-05-16
Payer: MEDICARE

## 2023-05-16 ENCOUNTER — OFFICE VISIT (OUTPATIENT)
Dept: ENDOCRINOLOGY | Age: 68
End: 2023-05-16
Payer: MEDICARE

## 2023-05-16 ENCOUNTER — PROCEDURE VISIT (OUTPATIENT)
Dept: ENDOCRINOLOGY | Age: 68
End: 2023-05-16

## 2023-05-16 VITALS
SYSTOLIC BLOOD PRESSURE: 133 MMHG | DIASTOLIC BLOOD PRESSURE: 75 MMHG | HEIGHT: 67 IN | WEIGHT: 187 LBS | BODY MASS INDEX: 29.35 KG/M2

## 2023-05-16 DIAGNOSIS — M81.0 OSTEOPOROSIS, POSTMENOPAUSAL: Primary | ICD-10-CM

## 2023-05-16 DIAGNOSIS — Z51.81 MEDICATION MONITORING ENCOUNTER: ICD-10-CM

## 2023-05-16 DIAGNOSIS — M81.0 OSTEOPOROSIS, POSTMENOPAUSAL: ICD-10-CM

## 2023-05-16 PROCEDURE — 3017F COLORECTAL CA SCREEN DOC REV: CPT | Performed by: INTERNAL MEDICINE

## 2023-05-16 PROCEDURE — 99214 OFFICE O/P EST MOD 30 MIN: CPT | Performed by: INTERNAL MEDICINE

## 2023-05-16 PROCEDURE — G8417 CALC BMI ABV UP PARAM F/U: HCPCS | Performed by: INTERNAL MEDICINE

## 2023-05-16 PROCEDURE — 77080 DXA BONE DENSITY AXIAL: CPT | Performed by: INTERNAL MEDICINE

## 2023-05-16 PROCEDURE — 1123F ACP DISCUSS/DSCN MKR DOCD: CPT | Performed by: INTERNAL MEDICINE

## 2023-05-16 PROCEDURE — 77080 DXA BONE DENSITY AXIAL: CPT

## 2023-05-16 PROCEDURE — 1036F TOBACCO NON-USER: CPT | Performed by: INTERNAL MEDICINE

## 2023-05-16 PROCEDURE — 1090F PRES/ABSN URINE INCON ASSESS: CPT | Performed by: INTERNAL MEDICINE

## 2023-05-16 PROCEDURE — G8427 DOCREV CUR MEDS BY ELIG CLIN: HCPCS | Performed by: INTERNAL MEDICINE

## 2023-05-16 PROCEDURE — G8399 PT W/DXA RESULTS DOCUMENT: HCPCS | Performed by: INTERNAL MEDICINE

## 2023-05-16 NOTE — PROGRESS NOTES
[facility]) Osteoporosis and [address] [place] [address]  Phone [phone]  Fax [phone]    NAME: Magen Bob OF BIRTH: 1955  INITIAL CONSULTATION: 06/18/2008  LAST OFFICE VISIT: 03/07/2022  TODAY'S VISIT: 05/16/2023    Labs @ [facility] 02/2023    PROBLEMS:   Osteoporosis by DXA 11/2007, lowest T-score -3.0, Z-score -2.0 in the spine (L2-L4)    Family history of osteoporosis, mother with vertebral compression fractures    Low bone density by DXA 05/2002, lowest Z-score -1.8 in the spine (L2-L4)    BMD decreased 3548-7147 and further 1321-9071 while on Fosamax    BMD decreased 9434-6914, lowest T-score -2.3 in the spine    Stress fracture of foot 2008, 60-mile breast cancer walk    Coccyx fracture 11/2009 (fell)    07/2021 LS MR old mild fx T12, L1    10/2021 ankle fracture (twisted playing pickleball); surgical intervention, slow to heal  Natural menopause age 46 (2004)  Disc disease, thoracic spine surgery 1997 and 2006    CURRENT MANAGEMENT FOR BONE HEALTH:   Calcium: 300 mg/d diet + 1330 mg/d supplements = 1630 mg/d  Vitamin D: 800 IU/d multivitamin + 800 IU/d with calcium = 1600 IU/d    25-OH D 41 ng/mL 02/2023  Exercise:  3 x weekly, walks 2 x weekly  Pharmacologic therapy: Prolia 60 mg SQ twice yearly started 01/2019    PREVIOUS MEDICATIONS FOR OSTEOPOROSIS:   Fosamax 8475-3107, stopped due to duration of treatment  alendronate 70 mg weekly started back 2014 by Dr. Rubia Castaneda, stopped 01/2018 by Dr. Rubia Castaneda due to GERD symptoms that were not limited to the day of the week she took alendronate    OTHER CURRENT MEDICATIONS (SELECTED): Fluticasone nasal spray  OTC MEDICATIONS: Fish oil, glucosamine    CHIEF COMPLAINT: Here for f/u of low bone density and fractures, monitoring treatment. No new related signs or symptoms.     PAST MEDICAL HISTORY, FAMILY HISTORY, SOCIAL HISTORY AND REVIEW OF SYSTEMS:  Relevant changes since last visit

## 2023-05-16 NOTE — PROGRESS NOTES
Prolia supplied by the physician office. It is the patient's responsibility to notify the physician office of new insurance plan or new identification number prior to appointment to prevent delay in treatment. Please send a copy of the front and back of the insurance card either by fax, mail or stop by the office. Informed patient if any signs of redness,rash,swelling or unusual symptoms occur, please contact the office. Prolia given per physician order.

## 2023-05-16 NOTE — RESULT ENCOUNTER NOTE
Suture removal    Date/Time: 8/12/2021 8:19 AM  Performed by: Shankar Mccallum  Authorized by: Bo Polk MD   Universal Protocol:  Risks and benefits: risks, benefits and alternatives were discussed  Consent given by: patient  Patient identity confirmed: verbally with patient        Location:     Laterality:  Left    Location:  Upper extremity (and right nasal wall)    Upper extremity location:  Elbow    Elbow location:  L elbow  Post-procedure details:     Post-removal:  Band-Aid applied    Patient tolerance of procedure: Tolerated well, no immediate complications  Comments:      Patient states procedure site on nose is  but no signs of infection  Biopsy results were discussed they were cysts  Patient to call the office if he has any concerns  Case Report   Surgical Pathology Report                         Case: [id number]                                    Authorizing Provider: Bo Polk MD            Collected:           07/29/2021 0937               Ordering Location:     [facility] Received:            07/29/2021 8876                                      Gap                                                                           Pathologist:           Robert Moore MD                                                            Specimens:   A) - Skin, Other, A; Right Nasal root                                                                B) - Skin, Other, B; Left Forearm                                                          Final Diagnosis   A  Skin, right nasal root, excision:     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, consistent with keratin granuloma (see note)      Note: These histopathologic findings may be seen following the rupture of a cyst/follicle; clinical pathological correlation is advised  Multiple levels examined             B  Skin, left forearm, excision:     Consistent with ruptured EPIDERMAL INCLUSION CYST           Scribe Attestation    I,:  makerist am acting as a scribe while in the presence of the attending physician :       I,:  Barb Alas MD personally performed the services described in this documentation    as scribed in my presence : PROLIA, BMD IS HIGHER NOW IN THE SPINE AND FEMORAL NECK AND TRENDING UP IN THE TROCHANTER. Consider repeating this study in 1-2 years to assess the patient's progress. _________________________________________________   Jaquelin Sharp MD, Director, [facility]) Osteoporosis and [address] [place] [address]

## 2023-06-08 ENCOUNTER — OFFICE VISIT (OUTPATIENT)
Dept: PULMONOLOGY | Age: 68
End: 2023-06-08
Payer: MEDICARE

## 2023-06-08 VITALS
OXYGEN SATURATION: 94 % | HEIGHT: 67 IN | HEART RATE: 73 BPM | BODY MASS INDEX: 29.66 KG/M2 | SYSTOLIC BLOOD PRESSURE: 98 MMHG | WEIGHT: 189 LBS | DIASTOLIC BLOOD PRESSURE: 70 MMHG

## 2023-06-08 DIAGNOSIS — R91.1 PULMONARY NODULE: Primary | ICD-10-CM

## 2023-06-08 PROCEDURE — 3017F COLORECTAL CA SCREEN DOC REV: CPT | Performed by: INTERNAL MEDICINE

## 2023-06-08 PROCEDURE — 1123F ACP DISCUSS/DSCN MKR DOCD: CPT | Performed by: INTERNAL MEDICINE

## 2023-06-08 PROCEDURE — 99203 OFFICE O/P NEW LOW 30 MIN: CPT | Performed by: INTERNAL MEDICINE

## 2023-06-08 PROCEDURE — G8399 PT W/DXA RESULTS DOCUMENT: HCPCS | Performed by: INTERNAL MEDICINE

## 2023-06-08 PROCEDURE — G8427 DOCREV CUR MEDS BY ELIG CLIN: HCPCS | Performed by: INTERNAL MEDICINE

## 2023-06-08 PROCEDURE — 1036F TOBACCO NON-USER: CPT | Performed by: INTERNAL MEDICINE

## 2023-06-08 PROCEDURE — 1090F PRES/ABSN URINE INCON ASSESS: CPT | Performed by: INTERNAL MEDICINE

## 2023-06-08 PROCEDURE — G8417 CALC BMI ABV UP PARAM F/U: HCPCS | Performed by: INTERNAL MEDICINE

## 2023-06-08 NOTE — PROGRESS NOTES
border, Primary or Immunocompromised, (age 12y+), IM, 100 mcg/0.5mL 01/19/2021, 02/16/2021, 11/27/2021, 05/11/2022    COVID-19, MODERNA Bivalent, (age 12y+), IM, 50 mcg/0.5 mL 12/07/2022    Hep A, HAVRIX, VAQTA, (age 16m-22y), IM, 0.5mL 08/24/2018    Hep A-Hep B, Christean Duster, (age 18y+), IM, 1mL 07/03/2018, 08/24/2018    Hepatitis B 08/24/2018    Pneumococcal, PCV-13, PREVNAR 13, (age 6w+), IM, 0.5mL 08/24/2018    Pneumococcal, PPSV23, PNEUMOVAX 23, (age 2y+), SC/IM, 0.5mL 04/14/2021    TDaP, ADACEL (age 10y-63y), BOOSTRIX (age 10y+), IM, 0.5mL 10/01/2008, 07/03/2018    Yellow Fever (YF-Vax) 06/25/2018    Zoster Live (Zostavax) 05/27/2014, 11/17/2015       Assessment: This is a 69 y.o. female with pulmonary nodule seen on CT    Plan:   -I personally reviewed patient's CT scan with her and there are some changes consistent with atelectasis at the lung bases that could be inflammatory changes, however her exam is clear. In addition patient has several subcentimeter pulmonary nodules, the largest of which is in the right lung that is very smooth, round and has an eccentric area of calcification. It is most likely granuloma but given the 8 mm size does warrant follow-up. The other nodule is perifissural and to my eyes is likely 2 distinct nodules, thus making it smaller than the 7 mm measured. A repeat CT scan in 12 months from the original is appropriate. Diagnosis Orders   1. Pulmonary nodule  CT CHEST WO CONTRAST        Time spent on this encounter was 32 minutes    - RTC 1 years w/ MD. Call or RTC sooner if symptoms persist or worsen acutely.

## 2023-06-09 ENCOUNTER — TELEPHONE (OUTPATIENT)
Dept: INTERNAL MEDICINE CLINIC | Age: 68
End: 2023-06-09

## 2023-08-03 ENCOUNTER — OFFICE VISIT (OUTPATIENT)
Dept: INTERNAL MEDICINE CLINIC | Age: 68
End: 2023-08-03

## 2023-08-03 VITALS
BODY MASS INDEX: 29.76 KG/M2 | WEIGHT: 190 LBS | SYSTOLIC BLOOD PRESSURE: 131 MMHG | HEART RATE: 81 BPM | DIASTOLIC BLOOD PRESSURE: 71 MMHG | OXYGEN SATURATION: 97 %

## 2023-08-03 DIAGNOSIS — R91.1 LUNG NODULE SEEN ON IMAGING STUDY: ICD-10-CM

## 2023-08-03 DIAGNOSIS — R23.2 HOT FLASHES: Primary | ICD-10-CM

## 2023-08-03 DIAGNOSIS — E78.5 HYPERLIPIDEMIA, UNSPECIFIED HYPERLIPIDEMIA TYPE: ICD-10-CM

## 2023-08-03 NOTE — PROGRESS NOTES
Ruthie Davalos (:  1955) is a 77 y.o. female, here for evaluation of the following chief complaint(s):    Results      ASSESSMENT/PLAN:  1. Hot flashes  -     Sedimentation Rate; Future  -     Lactate Dehydrogenase; Future  -     Metanephrines, urine, 24 hour; Future  -     5-HYDROXYINDOLEACETIC ACID (HIAA) URINE; Future    2, Hyperlipidemia, unspecified hyperlipidemia type   cardiac calcium score of 0. Patient will continue to work on low-fat diet. 3. Lung nodules  Repeat chest CT in     HM:  Colonoscopy due in . Mammogram due in . Osteoporosis  Remains on Prolia    Return in about 7 months (around 3/3/2024) for awv. SUBJECTIVE/OBJECTIVE:  HPI  Patient is here to follow-up recent results. Reviewed her DEXA scan, chest CT, cardiac calcium score, Echo and labs. She has seen pulmonary regarding the pulmonary nodules. Sees Dr. Makayla Manning regarding osteoporosis. The 10-year ASCVD risk score (Dustin DK, et al., 2019) is: 10%    Values used to calculate the score:      Age: 77 years      Sex: Female      Is Non- : No      Diabetic: No      Tobacco smoker: No      Systolic Blood Pressure: 398 mmHg      Is BP treated: No      HDL Cholesterol: 66 mg/dL      Total Cholesterol: 229 mg/dL    She continues to complain of a flushing sensation where sweat drips off her face for no apparent reason. May be slightly worse with alcohol. We reviewed her echocardiogram showing LVH but she does not have high blood pressure. Review of Systems   Constitutional:  Negative for fatigue and fever. HENT:  Negative for rhinorrhea, sore throat and trouble swallowing. Eyes:  Negative for visual disturbance. Respiratory:  Negative for cough and shortness of breath. Cardiovascular:  Negative for chest pain and palpitations. Gastrointestinal:  Negative for abdominal pain, diarrhea and nausea.    Genitourinary:  Negative for decreased urine volume, dysuria

## 2023-08-06 ASSESSMENT — ENCOUNTER SYMPTOMS
NAUSEA: 0
DIARRHEA: 0
COUGH: 0
SORE THROAT: 0
ABDOMINAL PAIN: 0
SHORTNESS OF BREATH: 0
RHINORRHEA: 0
TROUBLE SWALLOWING: 0

## 2023-08-11 DIAGNOSIS — R23.2 HOT FLASHES: ICD-10-CM

## 2023-08-15 LAB
5HIAA & CREATININE UR-IMP: NORMAL
5OH-INDOLEACETATE 24H UR-MCNC: 3 MG/L
5OH-INDOLEACETATE 24H UR-MRATE: 5 MG/D (ref 0–15)
5OH-INDOLEACETATE/CREAT 24H UR: 4 MG/GCR (ref 0–14)
COLLECT DURATION TIME SPEC: 24 H
CREAT 24H UR-MCNC: 82 MG/DL
CREAT 24H UR-MRATE: 1312 MG/D (ref 500–1400)
METANEPH 24H UR-MCNC: 54 UG/L
METANEPH 24H UR-MRATE: 86 UG/D (ref 36–229)
METANEPH+NORMETANEPH UR-IMP: NORMAL
METANEPH/CREAT 24H UR: 66 UG/G CRT (ref 0–300)
NORMETANEPHRINE 24H UR-MCNC: 193 UG/L
NORMETANEPHRINE 24H UR-MRATE: 309 UG/D (ref 95–650)
NORMETANEPHRINE/CREAT 24H UR: 235 UG/G CRT (ref 0–400)
SPECIMEN VOL ?TM UR: 1600 ML

## 2023-11-29 ENCOUNTER — NURSE ONLY (OUTPATIENT)
Dept: ENDOCRINOLOGY | Age: 68
End: 2023-11-29
Payer: MEDICARE

## 2023-11-29 DIAGNOSIS — M81.0 OSTEOPOROSIS, POSTMENOPAUSAL: Primary | ICD-10-CM

## 2023-11-29 PROCEDURE — 96372 THER/PROPH/DIAG INJ SC/IM: CPT | Performed by: INTERNAL MEDICINE

## 2023-12-13 ENCOUNTER — OFFICE VISIT (OUTPATIENT)
Dept: INTERNAL MEDICINE CLINIC | Age: 68
End: 2023-12-13

## 2023-12-13 VITALS
OXYGEN SATURATION: 95 % | HEART RATE: 81 BPM | SYSTOLIC BLOOD PRESSURE: 122 MMHG | BODY MASS INDEX: 30.76 KG/M2 | DIASTOLIC BLOOD PRESSURE: 68 MMHG | WEIGHT: 196.4 LBS

## 2023-12-13 DIAGNOSIS — Z12.31 ENCOUNTER FOR SCREENING MAMMOGRAM FOR MALIGNANT NEOPLASM OF BREAST: ICD-10-CM

## 2023-12-13 DIAGNOSIS — R73.03 PRE-DIABETES: Primary | ICD-10-CM

## 2023-12-13 DIAGNOSIS — Z11.59 SCREENING FOR VIRAL DISEASE: ICD-10-CM

## 2023-12-13 RX ORDER — VALACYCLOVIR HYDROCHLORIDE 500 MG/1
500 TABLET, FILM COATED ORAL DAILY
COMMUNITY
Start: 2023-09-08

## 2023-12-13 NOTE — PATIENT INSTRUCTIONS
Shots:  Rsv, Covid, Shingrix    Ozempic    In 4 weeks, send me message with weight and also if having side effects. Then may increase the dose to 0.5.     Chest ct 4/24    Mammogram  [id number]

## 2023-12-13 NOTE — PROGRESS NOTES
contain inaccuracies due to incorrect word recognition. If further clarification is needed please contact the office at [phone]          An electronic signature was used to authenticate this note.     --Beatriz Smart MD

## 2023-12-14 DIAGNOSIS — G43.809 OTHER MIGRAINE WITHOUT STATUS MIGRAINOSUS, NOT INTRACTABLE: ICD-10-CM

## 2023-12-14 RX ORDER — BUTALBITAL, ACETAMINOPHEN AND CAFFEINE 50; 325; 40 MG/1; MG/1; MG/1
TABLET ORAL
Qty: 30 TABLET | Refills: 0 | Status: SHIPPED | OUTPATIENT
Start: 2023-12-14 | End: 2024-01-17 | Stop reason: SDUPTHER

## 2024-01-16 ENCOUNTER — TELEPHONE (OUTPATIENT)
Dept: INTERNAL MEDICINE CLINIC | Age: 69
End: 2024-01-16

## 2024-01-16 DIAGNOSIS — G43.809 OTHER MIGRAINE WITHOUT STATUS MIGRAINOSUS, NOT INTRACTABLE: ICD-10-CM

## 2024-01-16 DIAGNOSIS — R73.03 PRE-DIABETES: ICD-10-CM

## 2024-01-16 RX ORDER — VALACYCLOVIR HYDROCHLORIDE 500 MG/1
500 TABLET, FILM COATED ORAL DAILY
Qty: 90 TABLET | Refills: 0 | Status: SHIPPED | OUTPATIENT
Start: 2024-01-16

## 2024-01-16 NOTE — TELEPHONE ENCOUNTER
Patient is calling in for  in regards to medication refill for:    valACYclovir (VALTREX) 500 MG tablet  Last appointment: 12/13/2023  Next appointment: 3/18/2024  Last refill: 12/13/23      Please send medication refill to :    [id number] - [place], [place] - [address] - P [phone] - F [phone]  [phone]

## 2024-01-16 NOTE — TELEPHONE ENCOUNTER
Patient is calling in for  in regards to 4 week follow up after using :    Semaglutide,0.25 or 0.5MG/DOS, 2 MG/3ML SOPN      Per patient she has had a weighloss so far of 4 lb since starting medication and states she has had no side effect's.    Patient would like to know if she is okay to increase medication and if so if we can send it to the pharmacy.     Please advise.

## 2024-01-17 RX ORDER — BUTALBITAL, ACETAMINOPHEN AND CAFFEINE 50; 325; 40 MG/1; MG/1; MG/1
TABLET ORAL
Qty: 30 TABLET | Refills: 0 | Status: SHIPPED | OUTPATIENT
Start: 2024-01-17

## 2024-01-17 NOTE — TELEPHONE ENCOUNTER
Last appointment: 12/13/2023  Next appointment: 3/18/2024  Last refill: 12/14/23    Patient advised, she also needs refill on medication pended to encounter.

## 2024-01-19 ENCOUNTER — TELEPHONE (OUTPATIENT)
Dept: ADMINISTRATIVE | Age: 69
End: 2024-01-19

## 2024-01-19 NOTE — TELEPHONE ENCOUNTER
Submitted PA for Ozempic (0.25 or 0.5 MG/DOSE) 2MG/3ML pen-injectors  Via CMM Key: [id number] STATUS: PENDING.    Follow up done daily; if no decision with in three days we will refax.  If another three days goes by with no decision will call the insurance for status.

## 2024-02-20 DIAGNOSIS — R73.03 PRE-DIABETES: ICD-10-CM

## 2024-02-20 NOTE — TELEPHONE ENCOUNTER
Patient's pharmacy is calling in for  in regards to patient's medication refill request. Per pharmacist she believes the patient only received 2 doses before her pen malfunction and no longer gave medication when patient injected. Pharmacist wanted to give contexts of why early refill was requested and also incase  was wanting to up the patient's dosage.

## 2024-02-21 RX ORDER — SEMAGLUTIDE 0.68 MG/ML
INJECTION, SOLUTION SUBCUTANEOUS
Qty: 3 ML | Refills: 0 | Status: SHIPPED | OUTPATIENT
Start: 2024-02-21

## 2024-02-21 NOTE — TELEPHONE ENCOUNTER
Requested Prescriptions     Pending Prescriptions Disp Refills    OZEMPIC, 0.25 OR 0.5 MG/DOSE, 2 MG/3ML SOPN [Pharmacy Med Name: Ozempic 0.25 mg or 0.5 mg (2 mg/3 mL) subcutaneous pen injector] 3 mL 0     Sig: INJECT 0.5mg UNDER THE SKIN EVERY 7 DAYS FOR 28 DAYS     Last OV - 12/13/23  Next OV - 3/18/24  Last refill - 12/13/23  Last labs - 4/24/23

## 2024-03-13 DIAGNOSIS — R73.03 PRE-DIABETES: ICD-10-CM

## 2024-03-13 RX ORDER — SEMAGLUTIDE 0.68 MG/ML
INJECTION, SOLUTION SUBCUTANEOUS
Qty: 3 ML | Refills: 0 | Status: SHIPPED | OUTPATIENT
Start: 2024-03-13

## 2024-03-13 NOTE — TELEPHONE ENCOUNTER
Medication:   Requested Prescriptions     Pending Prescriptions Disp Refills    OZEMPIC, 0.25 OR 0.5 MG/DOSE, 2 MG/3ML SOPN [Pharmacy Med Name: Ozempic 0.25 mg or 0.5 mg (2 mg/3 mL) subcutaneous pen injector] 3 mL 0     Sig: INJECT 0.5mg UNDER THE SKIN EVERY 7 DAYS FOR 28 DAYS       Last Filled:  2/21/2024     Patient Phone Number: [phone] (home)     Last appt: 12/13/2023   Next appt: 3/18/2024    Last Labs DM:   Lab Results   Component Value Date/Time    LABA1C 5.6 02/15/2023 04:15 PM

## 2024-03-18 ENCOUNTER — OFFICE VISIT (OUTPATIENT)
Dept: INTERNAL MEDICINE CLINIC | Age: 69
End: 2024-03-18
Payer: MEDICARE

## 2024-03-18 VITALS
SYSTOLIC BLOOD PRESSURE: 128 MMHG | WEIGHT: 184.4 LBS | BODY MASS INDEX: 27.95 KG/M2 | DIASTOLIC BLOOD PRESSURE: 82 MMHG | HEIGHT: 68 IN | OXYGEN SATURATION: 98 % | HEART RATE: 76 BPM

## 2024-03-18 DIAGNOSIS — Z12.11 SCREENING FOR COLON CANCER: ICD-10-CM

## 2024-03-18 DIAGNOSIS — Z00.00 MEDICARE ANNUAL WELLNESS VISIT, SUBSEQUENT: Primary | ICD-10-CM

## 2024-03-18 DIAGNOSIS — G47.00 INSOMNIA, UNSPECIFIED TYPE: ICD-10-CM

## 2024-03-18 DIAGNOSIS — R73.03 PRE-DIABETES: ICD-10-CM

## 2024-03-18 PROBLEM — F33.9 MAJOR DEPRESSIVE DISORDER, RECURRENT, UNSPECIFIED (HCC): Status: RESOLVED | Noted: 2021-07-21 | Resolved: 2024-03-18

## 2024-03-18 PROBLEM — F33.0 MAJOR DEPRESSIVE DISORDER, RECURRENT, MILD (HCC): Status: RESOLVED | Noted: 2021-07-21 | Resolved: 2024-03-18

## 2024-03-18 PROBLEM — F33.1 MAJOR DEPRESSIVE DISORDER, RECURRENT, MODERATE (HCC): Status: RESOLVED | Noted: 2021-07-21 | Resolved: 2024-03-18

## 2024-03-18 LAB — HBA1C MFR BLD: 5.6 %

## 2024-03-18 PROCEDURE — 1123F ACP DISCUSS/DSCN MKR DOCD: CPT | Performed by: INTERNAL MEDICINE

## 2024-03-18 PROCEDURE — G8484 FLU IMMUNIZE NO ADMIN: HCPCS | Performed by: INTERNAL MEDICINE

## 2024-03-18 PROCEDURE — G0439 PPPS, SUBSEQ VISIT: HCPCS | Performed by: INTERNAL MEDICINE

## 2024-03-18 PROCEDURE — 83036 HEMOGLOBIN GLYCOSYLATED A1C: CPT | Performed by: INTERNAL MEDICINE

## 2024-03-18 PROCEDURE — 3017F COLORECTAL CA SCREEN DOC REV: CPT | Performed by: INTERNAL MEDICINE

## 2024-03-18 RX ORDER — SEMAGLUTIDE 0.68 MG/ML
INJECTION, SOLUTION SUBCUTANEOUS
Qty: 9 ML | Refills: 1 | Status: SHIPPED | OUTPATIENT
Start: 2024-03-18

## 2024-03-18 RX ORDER — TRAZODONE HYDROCHLORIDE 50 MG/1
50 TABLET ORAL NIGHTLY PRN
Qty: 30 TABLET | Refills: 0 | Status: SHIPPED | OUTPATIENT
Start: 2024-03-18

## 2024-03-18 SDOH — ECONOMIC STABILITY: FOOD INSECURITY: WITHIN THE PAST 12 MONTHS, THE FOOD YOU BOUGHT JUST DIDN'T LAST AND YOU DIDN'T HAVE MONEY TO GET MORE.: NEVER TRUE

## 2024-03-18 SDOH — ECONOMIC STABILITY: FOOD INSECURITY: WITHIN THE PAST 12 MONTHS, YOU WORRIED THAT YOUR FOOD WOULD RUN OUT BEFORE YOU GOT MONEY TO BUY MORE.: NEVER TRUE

## 2024-03-18 SDOH — ECONOMIC STABILITY: INCOME INSECURITY: HOW HARD IS IT FOR YOU TO PAY FOR THE VERY BASICS LIKE FOOD, HOUSING, MEDICAL CARE, AND HEATING?: NOT HARD AT ALL

## 2024-03-18 ASSESSMENT — LIFESTYLE VARIABLES
HOW MANY STANDARD DRINKS CONTAINING ALCOHOL DO YOU HAVE ON A TYPICAL DAY: PATIENT DOES NOT DRINK
HOW OFTEN DO YOU HAVE A DRINK CONTAINING ALCOHOL: NEVER

## 2024-03-18 ASSESSMENT — PATIENT HEALTH QUESTIONNAIRE - PHQ9
SUM OF ALL RESPONSES TO PHQ QUESTIONS 1-9: 0
5. POOR APPETITE OR OVEREATING: NOT AT ALL
SUM OF ALL RESPONSES TO PHQ QUESTIONS 1-9: 0
SUM OF ALL RESPONSES TO PHQ QUESTIONS 1-9: 0
3. TROUBLE FALLING OR STAYING ASLEEP: NOT AT ALL
SUM OF ALL RESPONSES TO PHQ QUESTIONS 1-9: 0
SUM OF ALL RESPONSES TO PHQ9 QUESTIONS 1 & 2: 0
2. FEELING DOWN, DEPRESSED OR HOPELESS: NOT AT ALL
SUM OF ALL RESPONSES TO PHQ9 QUESTIONS 1 & 2: 0
1. LITTLE INTEREST OR PLEASURE IN DOING THINGS: NOT AT ALL
6. FEELING BAD ABOUT YOURSELF - OR THAT YOU ARE A FAILURE OR HAVE LET YOURSELF OR YOUR FAMILY DOWN: NOT AT ALL
7. TROUBLE CONCENTRATING ON THINGS, SUCH AS READING THE NEWSPAPER OR WATCHING TELEVISION: NOT AT ALL
2. FEELING DOWN, DEPRESSED OR HOPELESS: NOT AT ALL
1. LITTLE INTEREST OR PLEASURE IN DOING THINGS: NOT AT ALL
8. MOVING OR SPEAKING SO SLOWLY THAT OTHER PEOPLE COULD HAVE NOTICED. OR THE OPPOSITE, BEING SO FIGETY OR RESTLESS THAT YOU HAVE BEEN MOVING AROUND A LOT MORE THAN USUAL: NOT AT ALL
4. FEELING TIRED OR HAVING LITTLE ENERGY: NOT AT ALL
SUM OF ALL RESPONSES TO PHQ QUESTIONS 1-9: 0
SUM OF ALL RESPONSES TO PHQ QUESTIONS 1-9: 0
9. THOUGHTS THAT YOU WOULD BE BETTER OFF DEAD, OR OF HURTING YOURSELF: NOT AT ALL
10. IF YOU CHECKED OFF ANY PROBLEMS, HOW DIFFICULT HAVE THESE PROBLEMS MADE IT FOR YOU TO DO YOUR WORK, TAKE CARE OF THINGS AT HOME, OR GET ALONG WITH OTHER PEOPLE: NOT DIFFICULT AT ALL
SUM OF ALL RESPONSES TO PHQ QUESTIONS 1-9: 0
SUM OF ALL RESPONSES TO PHQ QUESTIONS 1-9: 0

## 2024-03-18 NOTE — PROGRESS NOTES
continues to get allergy shots every 2 weeks    ---  Patient's complete Health Risk Assessment and screening values have been reviewed and are found in Flowsheets. The following problems were reviewed today and where indicated follow up appointments were made and/or referrals ordered.    Positive Risk Factor Screenings with Interventions:               General HRA Questions:  Select all that apply: (!) New or Increased Pain, New or Increased Fatigue    Pain Interventions:  Seeing orthopedics for knee and ankle pain    Fatigue Interventions:  Discussed medication for insomnia and sleep study          Safety:  Do you have non-slip mats or non-slip surfaces or shower bars or grab bars in your shower or bathtub?: (!) No  Interventions:  Home safety tips                   Objective   Vitals:    03/18/24 1041   BP: 128/82   Pulse: 76   SpO2: 98%   Weight: 83.6 kg (184 lb 6.4 oz)   Height: 1.715 m (5' 7.5\")      Body mass index is 28.45 kg/m².             No Known Allergies  Prior to Visit Medications    Medication Sig Taking? Authorizing Provider   Semaglutide,0.25 or 0.5MG/DOS, (OZEMPIC, 0.25 OR 0.5 MG/DOSE,) 2 MG/3ML SOPN INJECT 0.5mg UNDER THE SKIN EVERY 7 DAYS FOR 28 DAYS Yes Cindy Giron MD   traZODone (DESYREL) 50 MG tablet Take 1 tablet by mouth nightly as needed for Sleep Yes Cindy Giron MD   butalbital-acetaminophen-caffeine (FIORICET, ESGIC) -40 MG per tablet TAKE 1 TABLET BY MOUTH EVERY 6 HOURS AS NEEDED FOR MIGRAINE Yes Cindy Giron MD   valACYclovir (VALTREX) 500 MG tablet Take 1 tablet by mouth daily Yes Cindy Giron MD   Misc Natural Products (APPLE CIDER VINEGAR DIET PO) Take by mouth daily 2 tabs daily Yes ProviderNatali MD   ibuprofen (ADVIL) 200 MG tablet Take 2 tablets by mouth every 12 hours as needed for Pain Yes Cindy Giron MD   acetaminophen (TYLENOL) 500 MG tablet Take 2 tablets by mouth in the morning and at

## 2024-03-18 NOTE — PATIENT INSTRUCTIONS
Bring colace stool softener and metamucil for constipation  Try some Miralax to use occasionally    --  Gi/colon  [id number]    Mammogram  [id number]    Get covid vaccine    Chest ct  [id number]- order from dr talbert  Due 4/24     Fatigue: Care Instructions  Overview     Fatigue is a feeling of tiredness, exhaustion, or lack of energy. You may feel fatigue because of too much or not enough activity. It can also come from stress, lack of sleep, boredom, and poor diet. Many medical problems, such as viral infections, can cause fatigue. Emotional problems, especially depression, are often the cause of fatigue.  Fatigue is most often a symptom of another problem. Treatment for fatigue depends on the cause. For example, if you have fatigue because you have a certain health problem, treating this problem also treats your fatigue. If depression or anxiety is the cause, treatment may help.  Follow-up care is a key part of your treatment and safety. Be sure to make and go to all appointments, and call your doctor if you are having problems. It's also a good idea to know your test results and keep a list of the medicines you take.  How can you care for yourself at home?  Get regular exercise. But try not to overdo it. It may help to go back and forth between rest and exercise.  Get plenty of rest.  Eat a variety of healthy foods. Try not to skip any meals.  Avoid or try to cut back on your use of caffeine, tobacco, and alcohol. Caffeine is most often found in coffee, tea, cola drinks, and energy drinks.  Limit medicines that can cause fatigue. These include medicines such as cold and allergy medicines.  When should you call for help?  Watch closely for changes in your health, and be sure to contact your doctor if:    You have new symptoms such as fever or a rash.     Your fatigue gets worse.     You have been feeling down, depressed, or hopeless. Or you may have lost interest in things that you usually enjoy.     You are not getting

## 2024-05-06 ENCOUNTER — TELEPHONE (OUTPATIENT)
Dept: CASE MANAGEMENT | Age: 69
End: 2024-05-06

## 2024-05-16 PROBLEM — F51.04 INSOMNIA, PSYCHOPHYSIOLOGICAL: Status: ACTIVE | Noted: 2024-05-16

## 2024-05-28 ENCOUNTER — HOSPITAL ENCOUNTER (OUTPATIENT)
Dept: CT IMAGING | Age: 69
Discharge: HOME | End: 2024-05-28
Attending: INTERNAL MEDICINE
Payer: MEDICARE

## 2024-05-28 DIAGNOSIS — R91.1 PULMONARY NODULE: ICD-10-CM

## 2024-05-28 PROCEDURE — 71250 CT THORAX DX C-: CPT

## 2024-05-30 ENCOUNTER — TELEPHONE (OUTPATIENT)
Dept: PULMONOLOGY | Age: 69
End: 2024-05-30

## 2024-06-04 ENCOUNTER — HOSPITAL ENCOUNTER (OUTPATIENT)
Dept: GENERAL RADIOLOGY | Age: 69
Discharge: HOME | End: 2024-06-04
Payer: MEDICARE

## 2024-06-04 ENCOUNTER — OFFICE VISIT (OUTPATIENT)
Dept: ENDOCRINOLOGY | Age: 69
End: 2024-06-04
Payer: MEDICARE

## 2024-06-04 VITALS — WEIGHT: 180 LBS | HEIGHT: 67 IN | BODY MASS INDEX: 28.25 KG/M2

## 2024-06-04 DIAGNOSIS — M81.0 OSTEOPOROSIS, POSTMENOPAUSAL: ICD-10-CM

## 2024-06-04 DIAGNOSIS — Z51.81 MEDICATION MONITORING ENCOUNTER: ICD-10-CM

## 2024-06-04 DIAGNOSIS — M81.0 OSTEOPOROSIS, POSTMENOPAUSAL: Primary | ICD-10-CM

## 2024-06-04 PROCEDURE — 1090F PRES/ABSN URINE INCON ASSESS: CPT | Performed by: INTERNAL MEDICINE

## 2024-06-04 PROCEDURE — G8417 CALC BMI ABV UP PARAM F/U: HCPCS | Performed by: INTERNAL MEDICINE

## 2024-06-04 PROCEDURE — 3017F COLORECTAL CA SCREEN DOC REV: CPT | Performed by: INTERNAL MEDICINE

## 2024-06-04 PROCEDURE — 1036F TOBACCO NON-USER: CPT | Performed by: INTERNAL MEDICINE

## 2024-06-04 PROCEDURE — 1123F ACP DISCUSS/DSCN MKR DOCD: CPT | Performed by: INTERNAL MEDICINE

## 2024-06-04 PROCEDURE — 77080 DXA BONE DENSITY AXIAL: CPT

## 2024-06-04 PROCEDURE — G8427 DOCREV CUR MEDS BY ELIG CLIN: HCPCS | Performed by: INTERNAL MEDICINE

## 2024-06-04 PROCEDURE — 99214 OFFICE O/P EST MOD 30 MIN: CPT | Performed by: INTERNAL MEDICINE

## 2024-06-04 PROCEDURE — 96372 THER/PROPH/DIAG INJ SC/IM: CPT | Performed by: INTERNAL MEDICINE

## 2024-06-04 PROCEDURE — G8399 PT W/DXA RESULTS DOCUMENT: HCPCS | Performed by: INTERNAL MEDICINE

## 2024-06-04 NOTE — PROGRESS NOTES
Informed patient if any signs of redness,rash,swelling or unusual symptoms occur, please contact the office. Prolia given per physician order.  
questionnaire of today's date).    INTERVAL HISTORY:  See problem list for chronic/inactive conditions.  She received Prolia without side effects.  No falls, near-falls or fractures.  Feels well overall.     NEUROLOGIC EXAM: Able to rise from chair without using arms.  No apparent focal motor or sensory deficit.  Reflexes brisk and symmetric.  Coordination appears normal.  MUSCULOSKELETAL EXAM: Gait: Intact without difficulty.  Steady without assistance.  Spine: Spinal contours are normal.  No spine tenderness to palpation or percussion.  Ribs and pelvis: Ribs appear normal. Two finger spaces between ribs and pelvis.    BONE DENSITY: Most recent done here using [employer] equipment.     T-scores  Initial study: 11/26/2007 L3-L4 -3.3 left fem. neck -1.5   Current study: 06/04/2024 L3-L4 -1.1 left fem. neck -0.6     The table below shows bone mineral density (grams/cm2), the appropriate measure for comparing serial scans. An “increase” or “decrease” is significant based on precision studies done at our center according to the ISCD protocol.      PA spine Proximal Femur (left)   Date L3-L4 Fem. neck Trochanter Total hip   11/26/2007 0.736 0.696 0.624 0.829   11/26/2008 0.850 0.707 0.654 0.869   05/20/2014 0.780 0.688 0.589 Too Low   06/18/2018 0.842 0.693 0.637 0.860   08/20/2019 0.788 0.716 0.639 0.854   08/31/2020 0.879 0.723 0.643 0.859   03/07/2022 0.918 0.775 0.643 0.883   05/16/2023 0.986 0.755 0.660 0.878   06/04/2024 0.983 0.779 0.649 0.873     IMPRESSION:  BONE DENSITY IS NOW NORMAL OR CLOSE IN THE SPINE AND HIP. SINCE THE LAST DXA, BMD DID NOT CHANGE SIGNIFICANTLY IN THE SPINE OR LEFT HIP.   COMPARED WITH 06/2018, BEFORE STARTING PROLIA, BMD IS HIGHER NOW IN THE SPINE, FEMORAL NECK AND TROCHANTER.     LABS. 07/2015, Ca 10.2, Cr 0.9. 05/2017 Ca 9.8 Cr 0.9. 01/2018 TSH. 95.2918 Ca 8.6 Cr 0.8.  04/2021 Ca 9.0 Cr 0.8. 02/2023 Ca 10.5 Cr 0.9. 12/2023 Ca 9.7 Cr 0.9.  IMAGING.  DXA printouts reviewed. 02/2018 CXR.

## 2024-07-02 ENCOUNTER — TELEPHONE (OUTPATIENT)
Dept: INTERNAL MEDICINE CLINIC | Age: 69
End: 2024-07-02

## 2024-07-02 NOTE — TELEPHONE ENCOUNTER
I called patient and left a detailed message with our office number included for her to call us back   normal...

## 2024-07-02 NOTE — TELEPHONE ENCOUNTER
Patient calling back in regards to vmail left. Gave her  note:    1230 today or offer overflow clinic/urgent care      Per patient unable to come in at 12:30 and wanted something after 3:30 pm . Informed her  would not be available, offered NP office for tomorrow since no availability today. Patient opted to go to urgent care instead.

## 2024-07-02 NOTE — TELEPHONE ENCOUNTER
Pt called in for Dr. Giron to see about getting in to be seen for Multiple symptoms this week, pt is having some URI symptoms that's started 2 weeks ago with just congestion and sore throat. Now has a UTI that started couple of days ago with frequency/urgency, vomiting, burning. Pt also stated no odor, not having trouble emptying her her bladder, did take OTC Rx patient wasn't sure of the name stated it turn urine orange color. Please advise pt on next steps. Pt didn't want to do the E-visit.

## 2024-07-09 DIAGNOSIS — R73.03 PRE-DIABETES: ICD-10-CM

## 2024-07-10 RX ORDER — SEMAGLUTIDE 0.68 MG/ML
INJECTION, SOLUTION SUBCUTANEOUS
Qty: 9 ML | Refills: 1 | Status: SHIPPED | OUTPATIENT
Start: 2024-07-10

## 2024-09-11 ENCOUNTER — TELEPHONE (OUTPATIENT)
Dept: CASE MANAGEMENT | Age: 69
End: 2024-09-11

## 2024-09-16 ENCOUNTER — OFFICE VISIT (OUTPATIENT)
Dept: INTERNAL MEDICINE CLINIC | Age: 69
End: 2024-09-16

## 2024-09-16 VITALS
DIASTOLIC BLOOD PRESSURE: 84 MMHG | HEART RATE: 80 BPM | WEIGHT: 175 LBS | SYSTOLIC BLOOD PRESSURE: 128 MMHG | OXYGEN SATURATION: 96 % | BODY MASS INDEX: 27.49 KG/M2

## 2024-09-16 DIAGNOSIS — H93.8X2 MASS OF LEFT EAR: ICD-10-CM

## 2024-09-16 DIAGNOSIS — R73.03 PRE-DIABETES: ICD-10-CM

## 2024-09-16 DIAGNOSIS — M81.0 OSTEOPOROSIS, POSTMENOPAUSAL: ICD-10-CM

## 2024-09-16 DIAGNOSIS — N39.0 RECURRENT UTI: Primary | ICD-10-CM

## 2024-09-16 PROBLEM — E11.9 TYPE 2 DIABETES MELLITUS (HCC): Status: ACTIVE | Noted: 2024-09-16

## 2024-09-16 RX ORDER — CETIRIZINE HYDROCHLORIDE 10 MG/1
10 TABLET ORAL DAILY
Qty: 30 TABLET | Refills: 0 | Status: SHIPPED | OUTPATIENT
Start: 2024-09-16 | End: 2024-10-16

## 2024-09-16 RX ORDER — NITROFURANTOIN 25; 75 MG/1; MG/1
100 CAPSULE ORAL 2 TIMES DAILY
Qty: 10 CAPSULE | Refills: 0 | Status: SHIPPED | OUTPATIENT
Start: 2024-09-16 | End: 2024-09-21

## 2024-09-24 ENCOUNTER — TELEPHONE (OUTPATIENT)
Dept: INTERNAL MEDICINE CLINIC | Age: 69
End: 2024-09-24

## 2024-09-27 ENCOUNTER — TELEPHONE (OUTPATIENT)
Dept: INTERNAL MEDICINE CLINIC | Age: 69
End: 2024-09-27

## 2024-09-27 DIAGNOSIS — R91.1 LUNG NODULE SEEN ON IMAGING STUDY: Primary | ICD-10-CM

## 2024-09-27 NOTE — TELEPHONE ENCOUNTER
Let patient know I heard back from Dr. Almaraz, the pulmonary physician, and she should get a repeat CAT scan of her chest in May 2025.  I have placed that order and she can call scheduling to set that up.

## 2024-11-25 ENCOUNTER — TELEPHONE (OUTPATIENT)
Dept: ENDOCRINOLOGY | Age: 69
End: 2024-11-25

## 2024-12-11 ENCOUNTER — NURSE ONLY (OUTPATIENT)
Dept: ENDOCRINOLOGY | Age: 69
End: 2024-12-11
Payer: MEDICARE

## 2024-12-11 DIAGNOSIS — M81.0 OSTEOPOROSIS, POSTMENOPAUSAL: Primary | ICD-10-CM

## 2024-12-11 PROCEDURE — 96372 THER/PROPH/DIAG INJ SC/IM: CPT | Performed by: INTERNAL MEDICINE

## 2025-01-02 DIAGNOSIS — R73.03 PRE-DIABETES: ICD-10-CM

## 2025-01-02 RX ORDER — SEMAGLUTIDE 0.68 MG/ML
INJECTION, SOLUTION SUBCUTANEOUS
Qty: 9 ML | Refills: 0 | Status: SHIPPED | OUTPATIENT
Start: 2025-01-02

## 2025-01-02 NOTE — TELEPHONE ENCOUNTER
Last appointment: 9/16/2024  Next appointment: Visit date not found  Last refill:   Last ordered: 5 months ago (7/10/2024) by Cindy Giron MD           Requested Prescriptions     Pending Prescriptions Disp Refills    OZEMPIC, 0.25 OR 0.5 MG/DOSE, 2 MG/3ML SOPN [Pharmacy Med Name: Ozempic 0.25 mg or 0.5 mg (2 mg/3 mL) subcutaneous pen injector] 9 mL 1     Sig: INJECT 0.5mg UNDER THE SKIN EVERY 7 DAYS

## 2025-01-29 ENCOUNTER — TELEPHONE (OUTPATIENT)
Dept: INTERNAL MEDICINE CLINIC | Age: 70
End: 2025-01-29

## 2025-01-29 NOTE — TELEPHONE ENCOUNTER
Patient is calling back in regards to a follow up on the PA for medication and stated her current weight is 170 as of today.Patient also scheduled for next appt on 3/17/25.     Please advise if anything else is needed for PA

## 2025-01-31 NOTE — TELEPHONE ENCOUNTER
Submitted PA for Ozempic (0.25 or 0.5 MG/DOSE) 2MG/3ML pen-injectors  Via CMM Key: [id number] STATUS: PENDING.    Follow up done daily; if no decision with in three days we will refax.  If another three days goes by with no decision will call the insurance for status.

## 2025-02-03 ENCOUNTER — TELEPHONE (OUTPATIENT)
Dept: INTERNAL MEDICINE CLINIC | Age: 70
End: 2025-02-03

## 2025-02-05 NOTE — TELEPHONE ENCOUNTER
Covered for type 2 diabetes mellitus. Left message on voicemail  advising insurance has denied this

## 2025-02-12 ENCOUNTER — OFFICE VISIT (OUTPATIENT)
Dept: INTERNAL MEDICINE CLINIC | Age: 70
End: 2025-02-12

## 2025-02-12 VITALS
OXYGEN SATURATION: 95 % | SYSTOLIC BLOOD PRESSURE: 110 MMHG | HEART RATE: 80 BPM | BODY MASS INDEX: 27.43 KG/M2 | DIASTOLIC BLOOD PRESSURE: 78 MMHG | WEIGHT: 174.6 LBS

## 2025-02-12 DIAGNOSIS — E66.3 OVERWEIGHT (BMI 25.0-29.9): ICD-10-CM

## 2025-02-12 DIAGNOSIS — G43.809 OTHER MIGRAINE WITHOUT STATUS MIGRAINOSUS, NOT INTRACTABLE: ICD-10-CM

## 2025-02-12 DIAGNOSIS — R73.03 PRE-DIABETES: Primary | ICD-10-CM

## 2025-02-12 DIAGNOSIS — E78.5 HYPERLIPIDEMIA, UNSPECIFIED HYPERLIPIDEMIA TYPE: ICD-10-CM

## 2025-02-12 RX ORDER — FLUTICASONE PROPIONATE 50 MCG
2 SPRAY, SUSPENSION (ML) NASAL DAILY
Qty: 16 G | Refills: 1 | Status: SHIPPED | OUTPATIENT
Start: 2025-02-12

## 2025-02-12 SDOH — ECONOMIC STABILITY: INCOME INSECURITY: IN THE LAST 12 MONTHS, WAS THERE A TIME WHEN YOU WERE NOT ABLE TO PAY THE MORTGAGE OR RENT ON TIME?: NO

## 2025-02-12 SDOH — ECONOMIC STABILITY: FOOD INSECURITY: WITHIN THE PAST 12 MONTHS, THE FOOD YOU BOUGHT JUST DIDN'T LAST AND YOU DIDN'T HAVE MONEY TO GET MORE.: NEVER TRUE

## 2025-02-12 SDOH — ECONOMIC STABILITY: TRANSPORTATION INSECURITY
IN THE PAST 12 MONTHS, HAS THE LACK OF TRANSPORTATION KEPT YOU FROM MEDICAL APPOINTMENTS OR FROM GETTING MEDICATIONS?: NO

## 2025-02-12 SDOH — ECONOMIC STABILITY: FOOD INSECURITY: WITHIN THE PAST 12 MONTHS, YOU WORRIED THAT YOUR FOOD WOULD RUN OUT BEFORE YOU GOT MONEY TO BUY MORE.: NEVER TRUE

## 2025-02-12 SDOH — ECONOMIC STABILITY: TRANSPORTATION INSECURITY
IN THE PAST 12 MONTHS, HAS LACK OF TRANSPORTATION KEPT YOU FROM MEETINGS, WORK, OR FROM GETTING THINGS NEEDED FOR DAILY LIVING?: NO

## 2025-02-12 ASSESSMENT — PATIENT HEALTH QUESTIONNAIRE - PHQ9
2. FEELING DOWN, DEPRESSED OR HOPELESS: NOT AT ALL
SUM OF ALL RESPONSES TO PHQ QUESTIONS 1-9: 0
SUM OF ALL RESPONSES TO PHQ QUESTIONS 1-9: 0
SUM OF ALL RESPONSES TO PHQ9 QUESTIONS 1 & 2: 0
1. LITTLE INTEREST OR PLEASURE IN DOING THINGS: NOT AT ALL
SUM OF ALL RESPONSES TO PHQ QUESTIONS 1-9: 0
2. FEELING DOWN, DEPRESSED OR HOPELESS: NOT AT ALL
SUM OF ALL RESPONSES TO PHQ QUESTIONS 1-9: 0
1. LITTLE INTEREST OR PLEASURE IN DOING THINGS: NOT AT ALL
SUM OF ALL RESPONSES TO PHQ9 QUESTIONS 1 & 2: 0

## 2025-02-12 NOTE — PATIENT INSTRUCTIONS
If wegovy is denied, then may consider zepbound    Then consider [facility] for zepbound  Consider topamax for migraine prevention and can also cause weight loss    See eye doctor if \"flashes\" continue    Try flonase for \"pressure\" headaches    Ct chest 5/25    Fasting labs

## 2025-02-12 NOTE — PROGRESS NOTES
Casie Oliveira (:  1955) is a 69 y.o. female, here for evaluation of the following chief complaint(s):    Discuss Medications (Ozempic denial)      ASSESSMENT/PLAN:  1. Pre-diabetes  Discussed that we would try to see if a weight loss indicated GLP-1 agonist can be approved.  Prescription sent for Wegovy.  If denied, I will send Zepbound.  Otherwise she can pursue [facility].  -     Semaglutide-Weight Management (WEGOVY) 0.25 MG/0.5ML SOAJ SC injection; Inject 0.25 mg into the skin every 7 days, Disp-2 mL, R-0Normal  -     Hemoglobin A1C; Future  2. Hyperlipidemia, unspecified hyperlipidemia type  In 2023-CT cardiac score was 0.  -     Lipid Panel; Future  -     Comprehensive Metabolic Panel; Future  3. Overweight (BMI 25.0-29.9)  -     Semaglutide-Weight Management (WEGOVY) 0.25 MG/0.5ML SOAJ SC injection; Inject 0.25 mg into the skin every 7 days, Disp-2 mL, R-0Normal    Osteoporosis, postmenopausal  Stable on Prolia  Insomnia, unspecified type  Taking Unisom which she finds most effective.  Lung nodules  Repeat chest CT     Headaches: Discussed she may try Flonase for possible sinus congestion.  Also advised seeing her eye doctor due to the \"flashing lights\" to check her retina.    Keep 3/25 visit for AWV and check weight      SUBJECTIVE/OBJECTIVE:  HPI  Patient would like to discuss getting back on a GLP-1 agonist.  It was denied by her insurance company.    She has had a headache in recent days.  She thinks it relates to barometric pressure change.  She does note seeing flashing lights when running around playing pickle ball.  She also felt dizzy.  She took a Fioricet which helped right away..    Review of Systems    Past Medical History:   Diagnosis Date    Allergic rhinitis     gets shots-dr jesus lagos    Foot fracture     several    Headache     allergy related    Low back pain     cyst, gary fung    Osteoarthritis     Osteoporosis     on prolia    Pneumonia 2019

## 2025-03-19 NOTE — PROGRESS NOTES
Medicare Annual Wellness Visit    Casie Oliveira is here for Medicare AWV    Assessment & Plan   Medicare annual wellness visit, subsequent  Other problems related to lifestyle  -     Hepatitis High-Risk; Future  Screening for colon cancer  -     [facility] - Calli Lu MD, Gastroenterology, [facility]  Encounter for screening mammogram for malignant neoplasm of breast  -     [facility] EZEQUIEL DIGITAL SCREEN BILATERAL; Future  Pre-diabetes  Overweight (BMI 25.0-29.9)  At patient request, she would like to pursue Zepbound via [facility]  Hyperlipidemia, unspecified hyperlipidemia type  In April 2023-CT cardiac score was 0.    Osteoporosis, postmenopausal  Stable on Prolia  Lung nodules  Repeat chest CT 5/25  Headaches: Takes occasional Fioricet.  Also encouraged Flonase.       Return in about 10 weeks (around 5/29/2025) for weight check.         Subjective       Patient would like to try to get Zepbound via the [facility] program.    Patient's complete Health Risk Assessment and screening values have been reviewed and are found in Flowsheets. The following problems were reviewed today and where indicated follow up appointments were made and/or referrals ordered.    Positive Risk Factor Screenings with Interventions:                    Safety:  Do you have non-slip mats or non-slip surfaces or shower bars or grab bars in your shower or bathtub?: (!) (Patient-Rptd) No  Interventions:  Home safety tips                   Objective   Vitals:    03/20/25 0919   BP: 120/80   Pulse: 66   SpO2: 97%   Weight: 80.7 kg (178 lb)   Height: 1.715 m (5' 7.5\")      Body mass index is 27.47 kg/m².                No Known Allergies  Prior to Visit Medications    Medication Sig Taking? Authorizing Provider   fluticasone (FLONASE) 50 MCG/ACT nasal spray 2 sprays by Each Nostril route daily Yes Cindy Giron MD   butalbital-acetaminophen-caffeine (FIORICET, ESGIC) -40 MG per tablet TAKE 1 TABLET BY MOUTH EVERY 6 HOURS AS

## 2025-03-20 ENCOUNTER — OFFICE VISIT (OUTPATIENT)
Dept: INTERNAL MEDICINE CLINIC | Age: 70
End: 2025-03-20

## 2025-03-20 VITALS
HEART RATE: 66 BPM | DIASTOLIC BLOOD PRESSURE: 80 MMHG | HEIGHT: 68 IN | SYSTOLIC BLOOD PRESSURE: 120 MMHG | OXYGEN SATURATION: 97 % | WEIGHT: 178 LBS | BODY MASS INDEX: 26.98 KG/M2

## 2025-03-20 DIAGNOSIS — Z12.31 ENCOUNTER FOR SCREENING MAMMOGRAM FOR MALIGNANT NEOPLASM OF BREAST: ICD-10-CM

## 2025-03-20 DIAGNOSIS — Z00.00 MEDICARE ANNUAL WELLNESS VISIT, SUBSEQUENT: Primary | ICD-10-CM

## 2025-03-20 DIAGNOSIS — E66.3 OVERWEIGHT (BMI 25.0-29.9): ICD-10-CM

## 2025-03-20 DIAGNOSIS — Z12.11 SCREENING FOR COLON CANCER: ICD-10-CM

## 2025-03-20 DIAGNOSIS — R73.03 PRE-DIABETES: ICD-10-CM

## 2025-03-20 DIAGNOSIS — Z72.89 OTHER PROBLEMS RELATED TO LIFESTYLE: ICD-10-CM

## 2025-03-20 PROBLEM — E11.9 TYPE 2 DIABETES MELLITUS: Status: RESOLVED | Noted: 2024-09-16 | Resolved: 2025-03-20

## 2025-03-20 SDOH — HEALTH STABILITY: PHYSICAL HEALTH: ON AVERAGE, HOW MANY MINUTES DO YOU ENGAGE IN EXERCISE AT THIS LEVEL?: 90 MIN

## 2025-03-20 SDOH — HEALTH STABILITY: PHYSICAL HEALTH: ON AVERAGE, HOW MANY DAYS PER WEEK DO YOU ENGAGE IN MODERATE TO STRENUOUS EXERCISE (LIKE A BRISK WALK)?: 4 DAYS

## 2025-03-20 ASSESSMENT — PATIENT HEALTH QUESTIONNAIRE - PHQ9
SUM OF ALL RESPONSES TO PHQ QUESTIONS 1-9: 0
SUM OF ALL RESPONSES TO PHQ QUESTIONS 1-9: 0
1. LITTLE INTEREST OR PLEASURE IN DOING THINGS: NOT AT ALL
2. FEELING DOWN, DEPRESSED OR HOPELESS: NOT AT ALL
SUM OF ALL RESPONSES TO PHQ QUESTIONS 1-9: 0
SUM OF ALL RESPONSES TO PHQ QUESTIONS 1-9: 0

## 2025-03-20 ASSESSMENT — LIFESTYLE VARIABLES
HOW OFTEN DO YOU HAVE A DRINK CONTAINING ALCOHOL: NEVER
HOW OFTEN DO YOU HAVE A DRINK CONTAINING ALCOHOL: 1
HOW OFTEN DO YOU HAVE SIX OR MORE DRINKS ON ONE OCCASION: 1
HOW MANY STANDARD DRINKS CONTAINING ALCOHOL DO YOU HAVE ON A TYPICAL DAY: 0
HOW MANY STANDARD DRINKS CONTAINING ALCOHOL DO YOU HAVE ON A TYPICAL DAY: PATIENT DOES NOT DRINK

## 2025-03-20 NOTE — PATIENT INSTRUCTIONS
Shots-  [email or web address]    Labs    Mammogram    Chest ct 5/25    Gi/colon    zepbound     A Healthy Heart: Care Instructions  Overview     Coronary artery disease, also called heart disease, occurs when a substance called plaque builds up in the vessels that supply oxygen-rich blood to your heart muscle. This can narrow the blood vessels and reduce blood flow. A heart attack happens when blood flow is completely blocked. A high-fat diet, smoking, and other factors increase the risk of heart disease.  Your doctor has found that you have a chance of having heart disease. A heart-healthy lifestyle can help keep your heart healthy and prevent heart disease. This lifestyle includes eating healthy, being active, staying at a weight that's healthy for you, and not smoking or using tobacco. It also includes taking medicines as directed, managing other health conditions, and trying to get a healthy amount of sleep.  Follow-up care is a key part of your treatment and safety. Be sure to make and go to all appointments, and call your doctor if you are having problems. It's also a good idea to know your test results and keep a list of the medicines you take.  How can you care for yourself at home?  Diet    Use less salt when you cook and eat. This helps lower your blood pressure. Taste food before salting. Add only a little salt when you think you need it. With time, your taste buds will adjust to less salt.     Eat fewer snack items, fast foods, canned soups, and other high-salt, high-fat, processed foods.     Read food labels and try to avoid saturated and trans fats. They increase your risk of heart disease by raising cholesterol levels.     Limit the amount of solid fat--butter, margarine, and shortening--you eat. Use olive, peanut, or canola oil when you cook. Bake, broil, and steam foods instead of frying them.     Eat a variety of fruit and vegetables every day. Dark green, deep orange, red, or yellow fruits and vegetables are

## 2025-03-21 ENCOUNTER — TELEPHONE (OUTPATIENT)
Dept: INTERNAL MEDICINE CLINIC | Age: 70
End: 2025-03-21

## 2025-03-21 DIAGNOSIS — E66.3 OVERWEIGHT (BMI 25.0-29.9): ICD-10-CM

## 2025-03-21 DIAGNOSIS — R73.03 PRE-DIABETES: ICD-10-CM

## 2025-03-21 NOTE — TELEPHONE ENCOUNTER
Patient is calling in for  in regards to last visit.Per PCP note:    Discussed that we would try to see if a weight loss indicated GLP-1 agonist can be approved. Prescription sent for Wegovy. If denied, I will send Zepbound. Otherwise she can pursue [facility].     No rx seen to have been sent.     Please advise,

## 2025-03-21 NOTE — TELEPHONE ENCOUNTER
The patient called back into the office did let her know :    Wegovy sent to [facility] on 2/12/2025. At 3/20 appt, medication marked as not taking and rmoved from list.      Did patient ever /receive Wegovy? Do not see that a PA was done for this medication. Do we need to send in Wegovy or change it to Zepbound?     The patient stated she never received a call that the wegovy Rx was at the pharmacy and didn't know it was approved, so the medication was never picked up. She can do either the wegovy or zepbound, but doesn't think her insurance will cover the wegovy. But would like to know if she can't get the wegovy what are the steps to getting the Zepbound without insurance covering it. Please advise.

## 2025-03-21 NOTE — TELEPHONE ENCOUNTER
Left message on voicemail for return call    [facility]. At 3/20 appt, medication marked as not taking and rmoved from list.     Did patient ever /receive Wegovy? Do not see that a PA was done for this medication. Do we need to send in Wegovy or change it to Zepbound?

## 2025-03-24 RX ORDER — SEMAGLUTIDE 0.25 MG/.5ML
INJECTION, SOLUTION SUBCUTANEOUS
Qty: 2 ML | Refills: 0 | OUTPATIENT
Start: 2025-03-24

## 2025-04-02 RX ORDER — VALACYCLOVIR HYDROCHLORIDE 500 MG/1
500 TABLET, FILM COATED ORAL DAILY
Qty: 90 TABLET | Refills: 0 | Status: SHIPPED | OUTPATIENT
Start: 2025-04-02

## 2025-04-02 NOTE — TELEPHONE ENCOUNTER
Patient is calling in for  in regards to needing medication refill for:    valACYclovir (VALTREX) 500 MG tablet  Last appointment: 3/20/2025  Next appointment: 5/29/2025  Last refill: 1/16/24      Please send refill to:    [facility] - [place], [place] - [address] - P [phone] - F [phone]  [address], [facility] [address]  Phone: [phone]  Fax: [phone]

## 2025-04-14 RX ORDER — TIRZEPATIDE 2.5 MG/.5ML
INJECTION, SOLUTION SUBCUTANEOUS
Qty: 2 ML | Refills: 0 | Status: SHIPPED | OUTPATIENT
Start: 2025-04-14

## 2025-04-18 RX ORDER — TIRZEPATIDE 2.5 MG/.5ML
INJECTION, SOLUTION SUBCUTANEOUS
Qty: 2 ML | Refills: 0 | OUTPATIENT
Start: 2025-04-18

## 2025-04-18 NOTE — TELEPHONE ENCOUNTER
Patient comment: I would like to increase the dosage of Zepbound. Not feeling this is working. Too low of a dosage thank you       Please advise Last appointment: 3/20/2025  Next appointment: 5/29/2025

## 2025-04-25 ENCOUNTER — TELEPHONE (OUTPATIENT)
Dept: CASE MANAGEMENT | Age: 70
End: 2025-04-25

## 2025-05-02 RX ORDER — TIRZEPATIDE 2.5 MG/.5ML
INJECTION, SOLUTION SUBCUTANEOUS
Qty: 2 ML | Refills: 0 | Status: CANCELLED | OUTPATIENT
Start: 2025-05-02

## 2025-05-02 NOTE — TELEPHONE ENCOUNTER
Patient comment: Current 2.5 mg is too low. Have not lost any weight. Called [facility] helpline. They suggested raise to 5mls. They recommend 2.5 as a beginning dose for just 4 weeks.         Last appointment: 3/20/2025  Next appointment: 5/29/2025        Last refill: 4/14/25  Please review patient comment

## 2025-05-07 NOTE — TELEPHONE ENCOUNTER
Patient is calling in for  in regards to medication , per patient a vile was supposed to be sent not the pen.     Please advise.

## 2025-05-13 ENCOUNTER — HOSPITAL ENCOUNTER (OUTPATIENT)
Dept: CT IMAGING | Age: 70
Discharge: HOME | End: 2025-05-13
Payer: MEDICARE

## 2025-05-13 DIAGNOSIS — R91.1 LUNG NODULE SEEN ON IMAGING STUDY: ICD-10-CM

## 2025-05-13 PROCEDURE — 71250 CT THORAX DX C-: CPT

## 2025-05-14 ENCOUNTER — RESULTS FOLLOW-UP (OUTPATIENT)
Dept: INTERNAL MEDICINE CLINIC | Age: 70
End: 2025-05-14

## 2025-05-16 ENCOUNTER — TELEPHONE (OUTPATIENT)
Dept: ENDOCRINOLOGY | Age: 70
End: 2025-05-16

## 2025-05-20 NOTE — TELEPHONE ENCOUNTER
Pt asked if the Wegovy rx can be re sent because the pharmacy is stating that they didn't receive it on 5- 2-2025

## 2025-05-20 NOTE — TELEPHONE ENCOUNTER
Called pt to clarify, as we have Zepbound on med list. It is Zepbound @ 5 mg but says it needs to be vile, not pens. Medication pended    Patient aware dr not in office until tomorrow

## 2025-05-20 NOTE — TELEPHONE ENCOUNTER
As a continuation to previous message, the patient stated the Rx should be a vial not a pen. Rx keeps getting rejected due to being sent over as a pen. Dosage upgraded from 2.5 to 5 mg according to the patient.

## 2025-05-21 NOTE — TELEPHONE ENCOUNTER
All orders from for zepbound in 2025  have all been sent to [facility].   5/2/25, 3/25/25,   I changed the pharmacy to [facility] on this order  for 2ml vials     Previous orders were for semaglutide and [facility].

## 2025-05-29 ENCOUNTER — OFFICE VISIT (OUTPATIENT)
Dept: INTERNAL MEDICINE CLINIC | Age: 70
End: 2025-05-29

## 2025-05-29 VITALS
WEIGHT: 176.6 LBS | HEART RATE: 70 BPM | OXYGEN SATURATION: 98 % | TEMPERATURE: 97.5 F | SYSTOLIC BLOOD PRESSURE: 120 MMHG | DIASTOLIC BLOOD PRESSURE: 80 MMHG | BODY MASS INDEX: 27.25 KG/M2

## 2025-05-29 DIAGNOSIS — R10.32 LLQ ABDOMINAL PAIN: ICD-10-CM

## 2025-05-29 DIAGNOSIS — Z72.89 OTHER PROBLEMS RELATED TO LIFESTYLE: ICD-10-CM

## 2025-05-29 DIAGNOSIS — R73.03 PRE-DIABETES: ICD-10-CM

## 2025-05-29 DIAGNOSIS — E78.5 HYPERLIPIDEMIA, UNSPECIFIED HYPERLIPIDEMIA TYPE: ICD-10-CM

## 2025-05-29 DIAGNOSIS — G43.809 OTHER MIGRAINE WITHOUT STATUS MIGRAINOSUS, NOT INTRACTABLE: ICD-10-CM

## 2025-05-29 DIAGNOSIS — E66.3 OVERWEIGHT (BMI 25.0-29.9): Primary | ICD-10-CM

## 2025-05-29 RX ORDER — VALACYCLOVIR HYDROCHLORIDE 500 MG/1
500 TABLET, FILM COATED ORAL DAILY
Qty: 90 TABLET | Refills: 1 | Status: SHIPPED | OUTPATIENT
Start: 2025-05-29 | End: 2025-11-25

## 2025-05-29 RX ORDER — BUTALBITAL, ACETAMINOPHEN AND CAFFEINE 50; 325; 40 MG/1; MG/1; MG/1
TABLET ORAL
Qty: 30 TABLET | Refills: 0 | Status: SHIPPED | OUTPATIENT
Start: 2025-05-29 | End: 2026-05-29

## 2025-05-29 NOTE — PATIENT INSTRUCTIONS
Track your weight  Multivitamin and extra protein    Fasting Labs soon    Get mammogram at [facility] and colonoscopy  [id number]- [facility]  Dr cody    See Dr Almaraz one more time re: CT chest    Dr gonzales   Gyn   [phone]    Metamucil daily

## 2025-05-29 NOTE — PROGRESS NOTES
Casie Oliveira (:  1955) is a 69 y.o. female, here for evaluation of the following chief complaint(s):    Follow-up (10 Weeks No CC)      ASSESSMENT/PLAN:  1. Overweight (BMI 25.0-29.9)  Patient desires weight loss but has not had weight loss on Zepbound 2.5.  Okay to increase to 5 mg dose.  If still not achieving weight loss, told her she should discontinue.  She is close to her goal weight.  2. Other migraine without status migrainosus, not intractable  -   Uses rare butalbital-acetaminophen-caffeine (FIORICET, ESGIC) -40 MG per tablet; TAKE 1 TABLET BY MOUTH EVERY 6 HOURS AS NEEDED FOR MIGRAINE, Disp-30 tablet, R-0Normal  3. Hyperlipidemia, unspecified hyperlipidemia type  If elevation in LPA, it would be recommended to start a statin medication.  The 10-year ASCVD risk score (Dustin RODRIGUEZ, et al., 2019) is: 8.9%    Values used to calculate the score:      Age: 69 years      Sex: Female      Is Non- : No      Diabetic: No      Tobacco smoker: No      Systolic Blood Pressure: 120 mmHg      Is BP treated: No      HDL Cholesterol: 79 mg/dL      Total Cholesterol: 205 mg/dL    4. LLQ abdominal pain  -     CT ABDOMEN PELVIS W WO CONTRAST Additional Contrast? None; Future  Advised daily Metamucil.    Return in about 6 months (around 2025) for cathy.    SUBJECTIVE/OBJECTIVE:  HPI  Patient is here to follow-up use of Zepbound.  Her weight has been stable on the current dose.  She denies side effects.    Patient reports she will be needing left knee replacement soon.  She has also has left shoulder pain and is seeing [facility] for this.      She continues with left lower quadrant abdominal pain that she has had for a long time.  She denies bowel changes.  She denies weight loss.    Review of Systems    Past Medical History:   Diagnosis Date    Allergic rhinitis     gets shots-dr jesus lagos    Foot fracture     several    Headache     allergy related    Low back pain     cyst,

## 2025-05-30 LAB
ALBUMIN SERPL-MCNC: 4.6 G/DL (ref 3.4–5)
ALBUMIN/GLOB SERPL: 1.8 {RATIO} (ref 1.1–2.2)
ALP SERPL-CCNC: 62 U/L (ref 40–129)
ALT SERPL-CCNC: 26 U/L (ref 10–40)
ANION GAP SERPL CALCULATED.3IONS-SCNC: 10 MMOL/L (ref 3–16)
AST SERPL-CCNC: 22 U/L (ref 15–37)
BILIRUB SERPL-MCNC: 0.5 MG/DL (ref 0–1)
BUN SERPL-MCNC: 23 MG/DL (ref 7–20)
CALCIUM SERPL-MCNC: 10.1 MG/DL (ref 8.3–10.6)
CHLORIDE SERPL-SCNC: 104 MMOL/L (ref 99–110)
CHOLEST SERPL-MCNC: 205 MG/DL (ref 0–199)
CO2 SERPL-SCNC: 27 MMOL/L (ref 21–32)
CREAT SERPL-MCNC: 0.9 MG/DL (ref 0.6–1.2)
EST. AVERAGE GLUCOSE BLD GHB EST-MCNC: 114 MG/DL
GFR SERPLBLD CREATININE-BSD FMLA CKD-EPI: 68 ML/MIN/{1.73_M2}
GLUCOSE SERPL-MCNC: 95 MG/DL (ref 70–99)
HBA1C MFR BLD: 5.6 %
HBV CORE IGM SERPL QL IA: NORMAL
HBV SURFACE AB SERPL IA-ACNC: <3.5 MIU/ML
HBV SURFACE AG SERPL QL IA: NORMAL
HDLC SERPL-MCNC: 79 MG/DL (ref 40–60)
LDLC SERPL CALC-MCNC: 115 MG/DL
POTASSIUM SERPL-SCNC: 4.7 MMOL/L (ref 3.5–5.1)
PROT SERPL-MCNC: 7.2 G/DL (ref 6.4–8.2)
SODIUM SERPL-SCNC: 141 MMOL/L (ref 136–145)
TRIGL SERPL-MCNC: 54 MG/DL (ref 0–150)
VLDLC SERPL CALC-MCNC: 11 MG/DL

## 2025-05-31 ENCOUNTER — RESULTS FOLLOW-UP (OUTPATIENT)
Dept: INTERNAL MEDICINE CLINIC | Age: 70
End: 2025-05-31

## 2025-05-31 LAB — LPA SERPL-MCNC: 32 MG/DL

## 2025-06-03 ENCOUNTER — TELEPHONE (OUTPATIENT)
Dept: PULMONOLOGY | Age: 70
End: 2025-06-03

## 2025-06-03 NOTE — TELEPHONE ENCOUNTER
I reviewed patient's CT.  She has a calcifying granuloma on the right and a tristen fissural nodule or nodules on the left that are stable going back 25 months.  All of the nodules are stable, so no follow up on the nodules are needed, provided she doesn't qualify for annual screening scans.

## 2025-06-03 NOTE — TELEPHONE ENCOUNTER
Patient last seen in 2023 for pulmonary nodules. Her last CT Chest 5/2025 was ordered and reviewed by Dr Giron. Dr. Giron asked pt to call our office to see if you would review the images and make a comment on the lung nodules. She has been told that they are stable. I will call patient and advise after images have been reviewed. Thank you

## 2025-06-04 NOTE — TELEPHONE ENCOUNTER
I left Dr Almaraz's message on pt's vm  & advised if she wanted to call the office she could but no follow up at this time is needed for pulm nodules.

## 2025-06-30 ENCOUNTER — HOSPITAL ENCOUNTER (OUTPATIENT)
Dept: GENERAL RADIOLOGY | Age: 70
Discharge: HOME | End: 2025-06-30
Payer: MEDICARE

## 2025-06-30 ENCOUNTER — OFFICE VISIT (OUTPATIENT)
Dept: ENDOCRINOLOGY | Age: 70
End: 2025-06-30
Payer: MEDICARE

## 2025-06-30 VITALS — BODY MASS INDEX: 27.04 KG/M2 | WEIGHT: 178.4 LBS | HEIGHT: 68 IN

## 2025-06-30 DIAGNOSIS — M81.0 OSTEOPOROSIS, POSTMENOPAUSAL: Primary | ICD-10-CM

## 2025-06-30 DIAGNOSIS — Z51.81 MEDICATION MONITORING ENCOUNTER: ICD-10-CM

## 2025-06-30 DIAGNOSIS — M81.0 OSTEOPOROSIS, POSTMENOPAUSAL: ICD-10-CM

## 2025-06-30 PROCEDURE — 96372 THER/PROPH/DIAG INJ SC/IM: CPT | Performed by: INTERNAL MEDICINE

## 2025-06-30 PROCEDURE — 1123F ACP DISCUSS/DSCN MKR DOCD: CPT | Performed by: INTERNAL MEDICINE

## 2025-06-30 PROCEDURE — 77080 DXA BONE DENSITY AXIAL: CPT | Performed by: INTERNAL MEDICINE

## 2025-06-30 PROCEDURE — 1090F PRES/ABSN URINE INCON ASSESS: CPT | Performed by: INTERNAL MEDICINE

## 2025-06-30 PROCEDURE — G8399 PT W/DXA RESULTS DOCUMENT: HCPCS | Performed by: INTERNAL MEDICINE

## 2025-06-30 PROCEDURE — 1159F MED LIST DOCD IN RCRD: CPT | Performed by: INTERNAL MEDICINE

## 2025-06-30 PROCEDURE — 1036F TOBACCO NON-USER: CPT | Performed by: INTERNAL MEDICINE

## 2025-06-30 PROCEDURE — 77080 DXA BONE DENSITY AXIAL: CPT

## 2025-06-30 PROCEDURE — G8427 DOCREV CUR MEDS BY ELIG CLIN: HCPCS | Performed by: INTERNAL MEDICINE

## 2025-06-30 PROCEDURE — 3017F COLORECTAL CA SCREEN DOC REV: CPT | Performed by: INTERNAL MEDICINE

## 2025-06-30 PROCEDURE — G8417 CALC BMI ABV UP PARAM F/U: HCPCS | Performed by: INTERNAL MEDICINE

## 2025-06-30 PROCEDURE — 99214 OFFICE O/P EST MOD 30 MIN: CPT | Performed by: INTERNAL MEDICINE

## 2025-06-30 NOTE — PROGRESS NOTES
[facility]  [address]  [facility] [address]  Phone [phone]  Fax [phone]    NAME: CAMILLE EM  YOB: 1955  INITIAL CONSULTATION: 06/18/2008  LAST OFFICE VISIT: 06/04/2024  TODAY'S VISIT: 06/30/2025    Labs @ [facility] 05/2025    PROBLEMS:   Osteoporosis by DXA 11/2007, lowest T-score -3.0, Z-score -2.0 in the spine (L2-L4)    Family history of osteoporosis, mother with vertebral compression fractures    Low bone density by DXA 05/2002, lowest Z-score -1.8 in the spine (L2-L4)    BMD decreased 6567-4428 and further 2004-8685 while on Fosamax    BMD decreased 6479-0572, lowest T-score -2.3 in the spine    Stress fracture of foot 2008, 60-mile breast cancer walk    Coccyx fracture 11/2009 (fell)    07/2021 LS MR old mild fx T12, L1    10/2021 ankle fracture (twisted playing pickleball); surgical intervention, slow to heal  Natural menopause age 48 (2004)  Disc disease, thoracic spine surgery 1997 and 2006    CURRENT MANAGEMENT FOR BONE HEALTH:   Calcium: 300 mg/d diet + 1330 mg/d supplements = 1630 mg/d  Vitamin D: 800 IU/d multivitamin + 800 IU/d with calcium = 1600 IU/d    25-OH D 41 ng/mL 02/2023  Exercise:  3 x weekly, walks 2 x weekly  Pharmacologic therapy: Prolia 60 mg SQ twice yearly started 01/2019    PREVIOUS MEDICATIONS FOR OSTEOPOROSIS:   Fosamax 1563-4021, stopped due to duration of treatment  alendronate 70 mg weekly started back 2014 by Dr. Graham, stopped 01/2018 by Dr. Graham due to GERD symptoms that were not limited to the day of the week she took alendronate    OTHER CURRENT MEDICATIONS (SELECTED): Fluticasone nasal spray  OTC MEDICATIONS: Fish oil, glucosamine    CHIEF COMPLAINT: Here for f/u of low bone density and fractures, monitoring treatment. No new related signs or symptoms.    PAST MEDICAL HISTORY, FAMILY HISTORY, SOCIAL HISTORY:  Relevant changes since last visit (see patient

## 2025-06-30 NOTE — PROGRESS NOTES
NDC [id number]    Lot  [id number]    Exp 8/31/27    Informed patient if any signs of redness,rash,swelling or unusual symptoms occur, please contact the office. Prolia given per physician order.

## 2025-07-29 ENCOUNTER — OFFICE VISIT (OUTPATIENT)
Dept: GYNECOLOGY | Age: 70
End: 2025-07-29
Payer: MEDICARE

## 2025-07-29 VITALS
HEART RATE: 69 BPM | RESPIRATION RATE: 17 BRPM | WEIGHT: 175.4 LBS | HEIGHT: 67 IN | DIASTOLIC BLOOD PRESSURE: 74 MMHG | SYSTOLIC BLOOD PRESSURE: 114 MMHG | OXYGEN SATURATION: 99 % | BODY MASS INDEX: 27.53 KG/M2

## 2025-07-29 DIAGNOSIS — Z01.419 WELL WOMAN EXAM WITH ROUTINE GYNECOLOGICAL EXAM: Primary | ICD-10-CM

## 2025-07-29 DIAGNOSIS — Z78.0 MENOPAUSE: ICD-10-CM

## 2025-07-29 DIAGNOSIS — N89.8 VAGINAL DRYNESS: ICD-10-CM

## 2025-07-29 PROCEDURE — G0101 CA SCREEN;PELVIC/BREAST EXAM: HCPCS | Performed by: OBSTETRICS & GYNECOLOGY

## 2025-07-29 PROCEDURE — 1159F MED LIST DOCD IN RCRD: CPT | Performed by: OBSTETRICS & GYNECOLOGY

## 2025-07-29 PROCEDURE — 1160F RVW MEDS BY RX/DR IN RCRD: CPT | Performed by: OBSTETRICS & GYNECOLOGY

## 2025-07-29 RX ORDER — ESTRADIOL 10 UG/1
10 TABLET, FILM COATED VAGINAL
Qty: 24 TABLET | Refills: 3 | Status: SHIPPED | OUTPATIENT
Start: 2025-07-31

## 2025-07-31 ASSESSMENT — ENCOUNTER SYMPTOMS
EYES NEGATIVE: 1
RESPIRATORY NEGATIVE: 1
GASTROINTESTINAL NEGATIVE: 1
ALLERGIC/IMMUNOLOGIC NEGATIVE: 1

## 2025-07-31 NOTE — PROGRESS NOTES
Subjective   Patient ID: Casie Oliveira is a 70 y.o. female.    Patient is here for annual. Patient in menopause. Patient with vaginal dryness.        Review of Systems   Constitutional: Negative.    HENT: Negative.     Eyes: Negative.    Respiratory: Negative.     Cardiovascular: Negative.    Gastrointestinal: Negative.    Endocrine: Negative.    Genitourinary: Negative.    Musculoskeletal:  Positive for arthralgias.   Skin: Negative.    Allergic/Immunologic: Negative.    Neurological: Negative.    Hematological: Negative.    Psychiatric/Behavioral: Negative.       Date of Birth 1955  Past Medical History:   Diagnosis Date    Allergic rhinitis     gets shots-dr jesus lagos    Breast mass     Foot fracture     several    Headache     allergy related    Herpes simplex virus (HSV) infection     Low back pain     cyst, ashwini sarika, gary navas    Menopause ovarian failure     Osteoarthritis     Osteoporosis     on prolia    Pneumonia 2019    Pre-diabetes     Rib fracture     right- 4 of them     Past Surgical History:   Procedure Laterality Date    ANKLE SURGERY Right     dr krishnan, kristin ball injury    CATARACT REMOVAL  2021    COLONOSCOPY  2021    fu 3 yrs    CYSTOSCOPY      LUMBAR DISCECTOMY      times 2    SEPTOPLASTY      WISDOM TOOTH EXTRACTION       OB History    Para Term  AB Living   2 2 2      SAB IAB Ectopic Molar Multiple Live Births              # Outcome Date GA Lbr Blake/2nd Weight Sex Type Anes PTL Lv   2 Term     F Vag-Spont      1 Term     M Vag-Spont        Social History     Socioeconomic History    Marital status: Single     Spouse name: Not on file    Number of children: 2    Years of education: Not on file    Highest education level: Not on file   Occupational History    Occupation: retired [employer]   Tobacco Use    Smoking status: Never    Smokeless tobacco: Never   Substance and Sexual Activity    Alcohol use: Not Currently     Comment: rarely    Drug use: No